# Patient Record
Sex: FEMALE | Race: AMERICAN INDIAN OR ALASKA NATIVE | NOT HISPANIC OR LATINO | Employment: UNEMPLOYED | URBAN - METROPOLITAN AREA
[De-identification: names, ages, dates, MRNs, and addresses within clinical notes are randomized per-mention and may not be internally consistent; named-entity substitution may affect disease eponyms.]

---

## 2017-01-01 ENCOUNTER — HOSPITAL ENCOUNTER (EMERGENCY)
Facility: CLINIC | Age: 27
Discharge: HOME | End: 2017-01-01
Attending: EMERGENCY MEDICINE | Admitting: EMERGENCY MEDICINE

## 2017-01-01 ENCOUNTER — APPOINTMENT (OUTPATIENT)
Dept: GENERAL RADIOLOGY | Facility: CLINIC | Age: 27
End: 2017-01-01
Attending: EMERGENCY MEDICINE

## 2017-01-01 VITALS
SYSTOLIC BLOOD PRESSURE: 139 MMHG | DIASTOLIC BLOOD PRESSURE: 85 MMHG | OXYGEN SATURATION: 98 % | HEART RATE: 97 BPM | TEMPERATURE: 97.6 F | RESPIRATION RATE: 16 BRPM

## 2017-01-01 DIAGNOSIS — S61.219A FINGER LACERATION, INITIAL ENCOUNTER: ICD-10-CM

## 2017-01-01 PROCEDURE — 73130 X-RAY EXAM OF HAND: CPT | Mod: LT

## 2017-01-01 PROCEDURE — 86803 HEPATITIS C AB TEST: CPT | Performed by: EMERGENCY MEDICINE

## 2017-01-01 PROCEDURE — 99284 EMERGENCY DEPT VISIT MOD MDM: CPT | Mod: Z6 | Performed by: EMERGENCY MEDICINE

## 2017-01-01 PROCEDURE — 25000132 ZZH RX MED GY IP 250 OP 250 PS 637: Performed by: EMERGENCY MEDICINE

## 2017-01-01 PROCEDURE — 86706 HEP B SURFACE ANTIBODY: CPT | Performed by: EMERGENCY MEDICINE

## 2017-01-01 PROCEDURE — 36415 COLL VENOUS BLD VENIPUNCTURE: CPT | Performed by: EMERGENCY MEDICINE

## 2017-01-01 PROCEDURE — 99284 EMERGENCY DEPT VISIT MOD MDM: CPT | Performed by: EMERGENCY MEDICINE

## 2017-01-01 PROCEDURE — 87389 HIV-1 AG W/HIV-1&-2 AB AG IA: CPT | Performed by: EMERGENCY MEDICINE

## 2017-01-01 RX ORDER — OXYCODONE HYDROCHLORIDE 5 MG/1
10 TABLET ORAL ONCE
Status: COMPLETED | OUTPATIENT
Start: 2017-01-01 | End: 2017-01-01

## 2017-01-01 RX ORDER — ACETAMINOPHEN 325 MG/1
650 TABLET ORAL EVERY 4 HOURS PRN
Status: DISCONTINUED | OUTPATIENT
Start: 2017-01-01 | End: 2017-01-01 | Stop reason: HOSPADM

## 2017-01-01 RX ORDER — ACETAMINOPHEN 500 MG
500 TABLET ORAL EVERY 6 HOURS PRN
Qty: 30 TABLET | Refills: 0 | Status: SHIPPED | OUTPATIENT
Start: 2017-01-01 | End: 2017-01-08

## 2017-01-01 RX ADMIN — AMOXICILLIN AND CLAVULANATE POTASSIUM 1 TABLET: 875; 125 TABLET, FILM COATED ORAL at 17:00

## 2017-01-01 RX ADMIN — ACETAMINOPHEN 650 MG: 325 TABLET, FILM COATED ORAL at 16:54

## 2017-01-01 RX ADMIN — OXYCODONE HYDROCHLORIDE 10 MG: 5 TABLET ORAL at 16:54
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
## 2017-01-01 NOTE — DISCHARGE INSTRUCTIONS
Infected Laceration, Not Sutured  A laceration is a cut through the skin. The cut has become infected. Because of the infection, and the amount of time that has passed since injury, the wound cannot be closed. It will heal best if left open and cleaned daily. It will seal over by growing new tissue from the sides and the bottom of the wound. Antibiotics may be prescribed. A scar will probably remain after it has healed.   Oral antibiotic medicine may be prescribed to treat the infection.  Home care    If antibiotics have been prescribed, take them exactly as directed. Do not stop taking them until they are gone or you are told to stop, even if you feel better.     Follow the health care provider s instructions on how to care for the cut.    Unless otherwise instructed, change the bandage twice a day for the first few days, until the drainage stops. Then change it once a day. Change the bandage if it becomes wet, stained with wound fluid, or dirty.    Clean the wound daily:    After removing the bandage, gently wash the area with soap and water. Use a wet cotton swab to loosen and remove any blood or crust that forms.    After cleaning, apply a thin layer of over-the-counter antibiotic ointment if advised. Reapply a fresh bandage.    Follow the healthcare provider's instructions for keeping the wound dry. You may be given restrictions on showering or tub baths.    If the bandage gets wet, remove it. Gently pat the wound dry with a clean cloth, then replace the wet bandage with a dry one.    Do not scratch, rub, or pick at the area.    Wash your hands with soap and warm water before and after cleaning the wound or changing the bandage.  Follow-up care  Follow up with your healthcare provider as advised. It is important to follow up to ensure that the infection is improving.  When to seek medical advice  Call your healthcare provider right away if any of these occur:    Symptoms don't begin to improve or get  worse    Red streaks coming from the wound    Increase in pus coming from the wound    Fever of 100.4 F (38 C) or higher, or as directed by your healthcare provider    4585-4676 [employer]. [address] [place] [address], [place] [address]. All rights reserved. This information is not intended as a substitute for professional medical care. Always follow your healthcare professional's instructions.

## 2017-01-01 NOTE — ED AVS SNAPSHOT
[facility], Emergency Department    [address]    [place] [address]    Phone:  [phone]    Fax:  [phone]                                       Keyona Fisher   MRN: [id number]    Department:  [facility], Emergency Department   Date of Visit:  1/1/2017           Patient Information     Date Of Birth          1990        Your diagnoses for this visit were:     Finger laceration, initial encounter        You were seen by Gilmar Antoine MD.      Follow-up Information     Follow up with Clinic, [facility]. Schedule an appointment as soon as possible for a visit in 2 days.    Contact information:    [address] [facility] [id number] [phone]          Follow up with [facility], Emergency Department In 2 days.    Specialty:  EMERGENCY MEDICINE    Why:  For wound re-check, or sooner if symptoms worsen    Contact information:    [address] [facility] [address] [phone]    Additional information:    The [facility] is located in the [facility]. lt is easily accessible from virtually any point in the [facility] area, via [address]        Discharge Instructions         Infected Laceration, Not Sutured  A laceration is a cut through the skin. The cut has become infected. Because of the infection, and the amount of time that has passed since injury, the wound cannot be closed. It will heal best if left open and cleaned daily. It will seal over by growing new tissue from the sides and the bottom of the wound. Antibiotics may be prescribed. A scar will probably remain after it has healed.   Oral antibiotic medicine may be prescribed to treat the infection.  Home care    If antibiotics have been prescribed, take them exactly as directed. Do not stop taking them until they are gone or you are told to stop, even if you feel better.     Follow the health care provider s instructions on how to care for the  cut.    Unless otherwise instructed, change the bandage twice a day for the first few days, until the drainage stops. Then change it once a day. Change the bandage if it becomes wet, stained with wound fluid, or dirty.    Clean the wound daily:    After removing the bandage, gently wash the area with soap and water. Use a wet cotton swab to loosen and remove any blood or crust that forms.    After cleaning, apply a thin layer of over-the-counter antibiotic ointment if advised. Reapply a fresh bandage.    Follow the healthcare provider's instructions for keeping the wound dry. You may be given restrictions on showering or tub baths.    If the bandage gets wet, remove it. Gently pat the wound dry with a clean cloth, then replace the wet bandage with a dry one.    Do not scratch, rub, or pick at the area.    Wash your hands with soap and warm water before and after cleaning the wound or changing the bandage.  Follow-up care  Follow up with your healthcare provider as advised. It is important to follow up to ensure that the infection is improving.  When to seek medical advice  Call your healthcare provider right away if any of these occur:    Symptoms don't begin to improve or get worse    Red streaks coming from the wound    Increase in pus coming from the wound    Fever of 100.4 F (38 C) or higher, or as directed by your healthcare provider    4545-5200 [employer]. [address] [place], [place] [address]. All rights reserved. This information is not intended as a substitute for professional medical care. Always follow your healthcare professional's instructions.          24 Hour Appointment Hotline       To make an appointment at any [facility], call [phone] ([phone]). If you don't have a family doctor or clinic, we will help you find one. [facility] are conveniently located to serve the needs of you and your family.             Review of your medicines      START taking         Dose / Directions Last dose taken    amoxicillin-clavulanate 875-125 MG per tablet   Commonly known as:  AUGMENTIN   Dose:  1 tablet   Quantity:  20 tablet        Take 1 tablet by mouth 2 times daily   Refills:  0          Our records show that you are taking the medicines listed below. If these are incorrect, please call your family doctor or clinic.        Dose / Directions Last dose taken    oxyCODONE-acetaminophen 5-325 MG per tablet   Commonly known as:  PERCOCET   Dose:  1-2 tablet   Quantity:  15 tablet        Take 1-2 tablets by mouth every 4 hours as needed for pain   Refills:  0        TYLENOL PO        Refills:  0                Prescriptions were sent or printed at these locations (1 Prescription)                   Other Prescriptions                Printed at Department/Unit printer (1 of 1)         amoxicillin-clavulanate (AUGMENTIN) 875-125 MG per tablet                Procedures and tests performed during your visit     Hand XR, G/E 3 views, left      Orders Needing Specimen Collection     Ordered          01/01/17 1639  Hepatitis B surface ira immune s - STAT, [facility]: STAT, Needs to be Collected     Scheduled Task Status   01/01/17 1638 Collect Hepatitis B surface ira immune s Open   Order Class:  PCU Collect                01/01/17 1639  Hepatitis C antibody - STAT, [facility]: STAT, Needs to be Collected     Scheduled Task Status   01/01/17 1638 Collect Hepatitis C antibody Open   Order Class:  PCU Collect                  Pending Results     Date and Time Order Name Status Description    1/1/2017 1635 Hand XR, G/E 3 views, left Preliminary             Thank you for choosing [facility]       Thank you for choosing [facility] for your care. Our goal is always to provide you with excellent care. Hearing back from our patients is one way we can continue to improve our services. Please take a few minutes to complete the written survey that you may receive in the mail after you visit with us. Thank you!       "  MyChart Information     [employer] lets you send messages to your doctor, view your test results, renew your prescriptions, schedule appointments and more. To sign up, go to [email or web address] . Click on \"Log in\" on the left side of the screen, which will take you to the Welcome page. Then click on \"Sign up Now\" on the right side of the page.     You will be asked to enter the access code listed below, as well as some personal information. Please follow the directions to create your username and password.     Your access code is: [email or web address]  Expires: 3/16/2017  9:51 PM     Your access code will  in 90 days. If you need help or a new code, please call your [facility] clinic or [phone].        After Visit Summary       This is your record. Keep this with you and show to your community pharmacist(s) and doctor(s) at your next visit.                  "

## 2017-01-01 NOTE — ED AVS SNAPSHOT
[facility], [place], Emergency Department    [address]    [facility] [address]    Phone:  [phone]    Fax:  [phone]                                       Keyona Fisher   MRN: [id number]    Department:  [facility], Emergency Department   Date of Visit:  1/1/2017           After Visit Summary Signature Page     I have received my discharge instructions, and my questions have been answered. I have discussed any challenges I see with this plan with the nurse or doctor.    ..........................................................................................................................................  Patient/Patient Representative Signature      ..........................................................................................................................................  Patient Representative Print Name and Relationship to Patient    ..................................................               ................................................  Date                                            Time    ..........................................................................................................................................  Reviewed by Signature/Title    ...................................................              ..............................................  Date                                                            Time

## 2017-01-02 LAB
HBV SURFACE AB SERPL IA-ACNC: 5.15 M[IU]/ML
HCV AB SERPL QL IA: NORMAL
HIV 1+2 AB+HIV1 P24 AG SERPL QL IA: NORMAL

## 2017-01-03 ENCOUNTER — TELEPHONE (OUTPATIENT)
Dept: EMERGENCY MEDICINE | Facility: CLINIC | Age: 27
End: 2017-01-03
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
## 2017-01-05 NOTE — TELEPHONE ENCOUNTER
[facility] Emergency Department Lab result notification     Patient/parent Name  Keyona    RN Assessment (Patient s current Symptoms), include time called.  [Insert Left message here if message left]  12:51 pm Pt returned call. I gave her lab results as below. S    RN Recommendations/Instructions per [facility] ED lab result protocol  Advised to continue antibiotics as prescribed for finger injury and to F/U with her PCP as recommended.     Please Contact your PCP clinic or return to the Emergency department if your:    Symptoms return.    Symptoms do not improve after 3 days on antibiotic.    Symptoms do not resolve after completing antibiotic.    Symptoms worsen or other concerning symptom's.    Larisa Fletcher RN  [facility] RN  Lung Nodule and ED Lab Result F/u RN  Epic pool (ED late result f/u RN): [id number]  # [phone]

## 2017-04-19 ENCOUNTER — APPOINTMENT (OUTPATIENT)
Dept: CT IMAGING | Facility: CLINIC | Age: 27
End: 2017-04-19
Attending: FAMILY MEDICINE

## 2017-04-19 ENCOUNTER — HOSPITAL ENCOUNTER (EMERGENCY)
Facility: CLINIC | Age: 27
Discharge: HOME | End: 2017-04-19
Attending: FAMILY MEDICINE | Admitting: FAMILY MEDICINE

## 2017-04-19 VITALS
DIASTOLIC BLOOD PRESSURE: 96 MMHG | SYSTOLIC BLOOD PRESSURE: 123 MMHG | TEMPERATURE: 98.3 F | WEIGHT: 273.4 LBS | BODY MASS INDEX: 48.43 KG/M2 | OXYGEN SATURATION: 100 % | RESPIRATION RATE: 16 BRPM | HEART RATE: 87 BPM

## 2017-04-19 DIAGNOSIS — J02.0 ACUTE STREPTOCOCCAL PHARYNGITIS: ICD-10-CM

## 2017-04-19 LAB
ANION GAP SERPL CALCULATED.3IONS-SCNC: 6 MMOL/L (ref 3–14)
BASOPHILS # BLD AUTO: 0 10E9/L (ref 0–0.2)
BASOPHILS NFR BLD AUTO: 0.1 %
BUN SERPL-MCNC: 11 MG/DL (ref 7–30)
CALCIUM SERPL-MCNC: 8.8 MG/DL (ref 8.5–10.1)
CHLORIDE SERPL-SCNC: 109 MMOL/L (ref 94–109)
CO2 SERPL-SCNC: 28 MMOL/L (ref 20–32)
CREAT BLD-MCNC: 0.7 MG/DL (ref 0.52–1.04)
CREAT SERPL-MCNC: 0.64 MG/DL (ref 0.52–1.04)
DEPRECATED S PYO AG THROAT QL EIA: ABNORMAL
DIFFERENTIAL METHOD BLD: NORMAL
EOSINOPHIL # BLD AUTO: 0.1 10E9/L (ref 0–0.7)
EOSINOPHIL NFR BLD AUTO: 0.9 %
ERYTHROCYTE [DISTWIDTH] IN BLOOD BY AUTOMATED COUNT: 14.3 % (ref 10–15)
GFR SERPL CREATININE-BSD FRML MDRD: >90 ML/MIN/1.7M2
GFR SERPL CREATININE-BSD FRML MDRD: NORMAL ML/MIN/1.7M2
GLUCOSE SERPL-MCNC: 86 MG/DL (ref 70–99)
HCT VFR BLD AUTO: 40.7 % (ref 35–47)
HGB BLD-MCNC: 13.8 G/DL (ref 11.7–15.7)
IMM GRANULOCYTES # BLD: 0 10E9/L (ref 0–0.4)
IMM GRANULOCYTES NFR BLD: 0.3 %
LYMPHOCYTES # BLD AUTO: 1.9 10E9/L (ref 0.8–5.3)
LYMPHOCYTES NFR BLD AUTO: 17.6 %
MCH RBC QN AUTO: 28.5 PG (ref 26.5–33)
MCHC RBC AUTO-ENTMCNC: 33.9 G/DL (ref 31.5–36.5)
MCV RBC AUTO: 84 FL (ref 78–100)
MICRO REPORT STATUS: ABNORMAL
MONOCYTES # BLD AUTO: 0.9 10E9/L (ref 0–1.3)
MONOCYTES NFR BLD AUTO: 8.4 %
NEUTROPHILS # BLD AUTO: 7.7 10E9/L (ref 1.6–8.3)
NEUTROPHILS NFR BLD AUTO: 72.7 %
NRBC # BLD AUTO: 0 10*3/UL
NRBC BLD AUTO-RTO: 0 /100
PLATELET # BLD AUTO: 221 10E9/L (ref 150–450)
POTASSIUM SERPL-SCNC: 3.9 MMOL/L (ref 3.4–5.3)
RBC # BLD AUTO: 4.85 10E12/L (ref 3.8–5.2)
SODIUM SERPL-SCNC: 143 MMOL/L (ref 133–144)
SPECIMEN SOURCE: ABNORMAL
WBC # BLD AUTO: 10.6 10E9/L (ref 4–11)

## 2017-04-19 PROCEDURE — 70491 CT SOFT TISSUE NECK W/DYE: CPT

## 2017-04-19 PROCEDURE — 99284 EMERGENCY DEPT VISIT MOD MDM: CPT | Mod: Z6 | Performed by: FAMILY MEDICINE

## 2017-04-19 PROCEDURE — 25500064 ZZH RX 255 OP 636: Performed by: FAMILY MEDICINE

## 2017-04-19 PROCEDURE — 99285 EMERGENCY DEPT VISIT HI MDM: CPT | Mod: 25 | Performed by: FAMILY MEDICINE

## 2017-04-19 PROCEDURE — 96360 HYDRATION IV INFUSION INIT: CPT | Performed by: FAMILY MEDICINE

## 2017-04-19 PROCEDURE — 82565 ASSAY OF CREATININE: CPT

## 2017-04-19 PROCEDURE — 25000128 H RX IP 250 OP 636: Performed by: FAMILY MEDICINE

## 2017-04-19 PROCEDURE — 80048 BASIC METABOLIC PNL TOTAL CA: CPT | Performed by: FAMILY MEDICINE

## 2017-04-19 PROCEDURE — 25000125 ZZHC RX 250: Performed by: FAMILY MEDICINE

## 2017-04-19 PROCEDURE — 25000132 ZZH RX MED GY IP 250 OP 250 PS 637: Performed by: FAMILY MEDICINE

## 2017-04-19 PROCEDURE — 85025 COMPLETE CBC W/AUTO DIFF WBC: CPT | Performed by: FAMILY MEDICINE

## 2017-04-19 PROCEDURE — 87880 STREP A ASSAY W/OPTIC: CPT | Performed by: EMERGENCY MEDICINE

## 2017-04-19 RX ORDER — TRAMADOL HYDROCHLORIDE 50 MG/1
50-100 TABLET ORAL EVERY 6 HOURS PRN
Qty: 15 TABLET | Refills: 0 | Status: ON HOLD | OUTPATIENT
Start: 2017-04-19 | End: 2018-01-28

## 2017-04-19 RX ORDER — IOPAMIDOL 755 MG/ML
100 INJECTION, SOLUTION INTRAVASCULAR ONCE
Status: COMPLETED | OUTPATIENT
Start: 2017-04-19 | End: 2017-04-19

## 2017-04-19 RX ORDER — HYDROCODONE BITARTRATE AND ACETAMINOPHEN 5; 325 MG/1; MG/1
1 TABLET ORAL ONCE
Status: COMPLETED | OUTPATIENT
Start: 2017-04-19 | End: 2017-04-19

## 2017-04-19 RX ADMIN — IOPAMIDOL 80 ML: 755 INJECTION, SOLUTION INTRAVENOUS at 17:49

## 2017-04-19 RX ADMIN — SODIUM CHLORIDE 1000 ML: 9 INJECTION, SOLUTION INTRAVENOUS at 16:53

## 2017-04-19 RX ADMIN — SODIUM CHLORIDE 50 ML: 9 INJECTION, SOLUTION INTRAVENOUS at 17:50

## 2017-04-19 RX ADMIN — HYDROCODONE BITARTRATE AND ACETAMINOPHEN 1 TABLET: 5; 325 TABLET ORAL at 16:34

## 2017-04-19 NOTE — DISCHARGE INSTRUCTIONS
Pharyngitis: Strep (Confirmed)     You have had a positive test for strep throat. Strep throat is a contagious illness. It is spread by coughing, kissing or by touching others after touching your mouth or nose. Symptoms include throat pain which is worse with swallowing, aching all over, headache and fever. It is treated with antibiotic medication. This should help you start to feel better within 1-2 days.  Home care    Rest at home. Drink plenty of fluids to avoid dehydration.    No work or school for the first 2 days of taking the antibiotics. After this time, you will not be contagious. You can then return to school or work if you are feeling better.     The antibiotic medication must be taken for the full 10 days, even if you feel better. This is very important to ensure the infection is treated. It is also important to prevent drug-resistent organisms from developing. If you were given an antibiotic shot, no more antibiotics are needed.    You may use acetaminophen (Tylenol) or ibuprofen (Motrin, Advil) to control pain or fever, unless another medicine was prescribed for this. (NOTE: If you have chronic liver or kidney disease or ever had a stomach ulcer or GI bleeding, talk with your doctor before using these medicines.)    Throat lozenges or sprays (such as Chloraseptic) help reduce pain. Gargling with warm salt water will also reduce throat pain. Dissolve 1/2 teaspoon of salt in 1 glass of warm water. This may be useful just before meals.     Soft foods are okay. Avoid salty or spicy foods.  Follow-up care  Follow up with your healthcare provider or our staff if you are not improving over the next week.  When to seek medical advice  Call your healthcare provider right away if any of these occur:    Fever as directed by your doctor     New or worsening ear pain, sinus pain, or headache    Painful lumps in the back of neck    Stiff neck    Lymph nodes are getting larger or becoming soft in the  middle    Inability to swallow liquids, excessive drooling, or inability to open mouth wide due to throat pain    Signs of dehydration (very dark urine or no urine, sunken eyes, dizziness)    Trouble breathing or noisy breathing    Muffled voice    New rash    6848-9767 [employer]. [address] [place] [address]. All rights reserved. This information is not intended as a substitute for professional medical care. Always follow your healthcare professional's instructions.

## 2017-04-19 NOTE — ED AVS SNAPSHOT
[facility], Emergency Department    [address]    [place] [address]    Phone:  [phone]    Fax:  [phone]                                       Keyona Fisher   MRN: [id number]    Department:  [facility], Emergency Department   Date of Visit:  4/19/2017           Patient Information     Date Of Birth          1990        Your diagnoses for this visit were:     Acute streptococcal pharyngitis        You were seen by John Rebollar MD.      Follow-up Information     Follow up with Clinic, [facility].    Why:  As needed    Contact information:    [address] [facility] [address]  [phone]          Discharge Instructions         Pharyngitis: Strep (Confirmed)     You have had a positive test for strep throat. Strep throat is a contagious illness. It is spread by coughing, kissing or by touching others after touching your mouth or nose. Symptoms include throat pain which is worse with swallowing, aching all over, headache and fever. It is treated with antibiotic medication. This should help you start to feel better within 1-2 days.  Home care    Rest at home. Drink plenty of fluids to avoid dehydration.    No work or school for the first 2 days of taking the antibiotics. After this time, you will not be contagious. You can then return to school or work if you are feeling better.     The antibiotic medication must be taken for the full 10 days, even if you feel better. This is very important to ensure the infection is treated. It is also important to prevent drug-resistent organisms from developing. If you were given an antibiotic shot, no more antibiotics are needed.    You may use acetaminophen (Tylenol) or ibuprofen (Motrin, Advil) to control pain or fever, unless another medicine was prescribed for this. (NOTE: If you have chronic liver or kidney disease or ever had a stomach ulcer or GI bleeding, talk with your doctor before using these  medicines.)    Throat lozenges or sprays (such as Chloraseptic) help reduce pain. Gargling with warm salt water will also reduce throat pain. Dissolve 1/2 teaspoon of salt in 1 glass of warm water. This may be useful just before meals.     Soft foods are okay. Avoid salty or spicy foods.  Follow-up care  Follow up with your healthcare provider or our staff if you are not improving over the next week.  When to seek medical advice  Call your healthcare provider right away if any of these occur:    Fever as directed by your doctor     New or worsening ear pain, sinus pain, or headache    Painful lumps in the back of neck    Stiff neck    Lymph nodes are getting larger or becoming soft in the middle    Inability to swallow liquids, excessive drooling, or inability to open mouth wide due to throat pain    Signs of dehydration (very dark urine or no urine, sunken eyes, dizziness)    Trouble breathing or noisy breathing    Muffled voice    New rash    2237-3608 [employer]. [address] [place] [address]. All rights reserved. This information is not intended as a substitute for professional medical care. Always follow your healthcare professional's instructions.          24 Hour Appointment Hotline       To make an appointment at any [facility] clinic, call [phone] ([phone]). If you don't have a family doctor or clinic, we will help you find one. [facility] clinics are conveniently located to serve the needs of you and your family.             Review of your medicines      START taking        Dose / Directions Last dose taken    amoxicillin-clavulanate 875-125 MG per tablet   Commonly known as:  AUGMENTIN   Dose:  1 tablet   Quantity:  20 tablet        Take 1 tablet by mouth 2 times daily for 10 days   Refills:  0        traMADol 50 MG tablet   Commonly known as:  ULTRAM   Dose:   mg   Quantity:  15 tablet        Take 1-2 tablets ( mg) by mouth every 6 hours as needed for pain  "  Refills:  0          Our records show that you are taking the medicines listed below. If these are incorrect, please call your family doctor or clinic.        Dose / Directions Last dose taken    acetaminophen 500 MG Caps   Dose:  2 tablet        Take 2 tablets by mouth every 6 hours as needed   Refills:  0                Prescriptions were sent or printed at these locations (2 Prescriptions)                   Other Prescriptions                Printed at Department/Unit printer (2 of 2)         amoxicillin-clavulanate (AUGMENTIN) 875-125 MG per tablet               traMADol (ULTRAM) 50 MG tablet                Procedures and tests performed during your visit     Basic metabolic panel    CBC with platelets differential    Creatinine POCT    ISTAT creatinine  POCT    Rapid strep screen    Soft tissue neck CT w contrast      Orders Needing Specimen Collection     None      Pending Results     Date and Time Order Name Status Description    4/19/2017 1613 Soft tissue neck CT w contrast Preliminary             Pending Culture Results     No orders found from 4/17/2017 to 4/20/2017.            Thank you for choosing [facility]       Thank you for choosing [facility] for your care. Our goal is always to provide you with excellent care. Hearing back from our patients is one way we can continue to improve our services. Please take a few minutes to complete the written survey that you may receive in the mail after you visit with us. Thank you!        [employer] Information     [employer] lets you send messages to your doctor, view your test results, renew your prescriptions, schedule appointments and more. To sign up, go to [email or web address] . Click on \"Log in\" on the left side of the screen, which will take you to the Welcome page. Then click on \"Sign up Now\" on the right side of the page.     You will be asked to enter the access code listed below, as well as some personal information. Please follow the directions to create " your username and password.     Your access code is: [email or web address]  Expires: 2017  6:14 PM     Your access code will  in 90 days. If you need help or a new code, please call your [facility] or [phone].        Care EveryWhere ID     This is your Care EveryWhere ID. This could be used by other organizations to access your [facility] medical records  [id number]        After Visit Summary       This is your record. Keep this with you and show to your community pharmacist(s) and doctor(s) at your next visit.

## 2017-04-19 NOTE — ED AVS SNAPSHOT
[facility], [place], Emergency Department    [address]    [place] [address]    Phone:  [phone]    Fax:  [phone]                                       Keyona Fisher   MRN: [id number]    Department:  [facility], Emergency Department   Date of Visit:  4/19/2017           After Visit Summary Signature Page     I have received my discharge instructions, and my questions have been answered. I have discussed any challenges I see with this plan with the nurse or doctor.    ..........................................................................................................................................  Patient/Patient Representative Signature      ..........................................................................................................................................  Patient Representative Print Name and Relationship to Patient    ..................................................               ................................................  Date                                            Time    ..........................................................................................................................................  Reviewed by Signature/Title    ...................................................              ..............................................  Date                                                            Time

## 2017-04-21 ASSESSMENT — ENCOUNTER SYMPTOMS
SHORTNESS OF BREATH: 0
TROUBLE SWALLOWING: 1
FEVER: 0
ABDOMINAL PAIN: 0
SORE THROAT: 1

## 2017-04-21 NOTE — ED PROVIDER NOTES
History     Chief Complaint   Patient presents with     Pharyngitis     sore throat started yesterday     HPI  Keyona Fisher is a 26 year old female who presents to emergency room today with a 3 to four-day history of increasing sore throat now having some difficulty swallowing food and feeling as though her throat has swollen she has increased pain fevers chills and slight cough she also notes bilateral ear discomfort.  Patient denies any abdominal pain no change in bowel or bladder symptoms and at this time is primarily focused on the sore throat.    I have reviewed the Medications, Allergies, Past Medical and Surgical History, and Social History in the Epic system.    PERSONAL MEDICAL HISTORY  History reviewed. No pertinent past medical history.  PAST SURGICAL HISTORY  History reviewed. No pertinent surgical history.  FAMILY HISTORY  No family history on file.  SOCIAL HISTORY  Social History   Substance Use Topics     Smoking status: Current Every Day Smoker     Packs/day: 0.25     Smokeless tobacco: Never Used     Alcohol use No      Comment: occ     MEDICATIONS  No current facility-administered medications for this encounter.      Current Outpatient Prescriptions   Medication     acetaminophen 500 MG CAPS     amoxicillin-clavulanate (AUGMENTIN) 875-125 MG per tablet     traMADol (ULTRAM) 50 MG tablet     ALLERGIES  Allergies   Allergen Reactions     Ibuprofen          Review of Systems   Constitutional: Negative for fever.   HENT: Positive for congestion, ear pain, sore throat and trouble swallowing.    Respiratory: Negative for shortness of breath.    Cardiovascular: Negative for chest pain.   Gastrointestinal: Negative for abdominal pain.   All other systems reviewed and are negative.      Physical Exam   BP: 115/70  Pulse: 89  Temp: 99.4  F (37.4  C)  Resp: 16  Weight: 124 kg (273 lb 6.4 oz)  SpO2: 97 %  Physical Exam   Constitutional: She is oriented to person, place, and time. No distress.   HENT:    Head: Atraumatic.   Right Ear: A middle ear effusion is present.   Left Ear: A middle ear effusion is present.   Mouth/Throat: Oropharyngeal exudate and posterior oropharyngeal erythema present.   Patient had basic swelling of the posterior tonsils with significant increase in swelling on the left side I'm suspicious there may be underlying abscess and therefore obtained a CT.   Eyes: Pupils are equal, round, and reactive to light. No scleral icterus.   Cardiovascular: Normal heart sounds and intact distal pulses.    Pulmonary/Chest: Breath sounds normal. No respiratory distress.   Abdominal: Soft. Bowel sounds are normal. There is no tenderness.   Musculoskeletal: She exhibits no edema or tenderness.   Neurological: She is alert and oriented to person, place, and time. No cranial nerve deficit. She exhibits normal muscle tone. Coordination normal.   Skin: Skin is warm. No rash noted. She is not diaphoretic.       ED Course     ED Course     Procedures         Critical Care time:  none       CT SCAN OF THE NECK WITH CONTRAST 4/19/2017 5:48 PM      HISTORY: Left-sided peritonsillar pain.     TECHNIQUE: Axial images and coronal reformations. 80 mL Isovue-370 IV.  Radiation dose for this scan was reduced using automated exposure  control, adjustment of the mA and/or kV according to patient size, or  iterative reconstruction technique.     COMPARISON: None.     FINDINGS:  Visualized sinuses, nasopharynx and orbits: Minimal mucosal thickening  noted in the right maxillary sinus. No air-fluid levels. The orbits  and nasopharynx are normal.     Tongue, oral cavity and oropharynx: Normal. No evidence for any  palatine tonsillar abscess.     Hypopharynx: Normal.      Larynx and trachea: Normal.      Thyroid: Normal.     Submandibular glands: Normal.      Parotid glands: Normal.       Lymph nodes: There are mildly prominent jugulodigastric lymph nodes  bilaterally, but these are not definitely pathologic.      Vasculature:  Normal.      Upper mediastinum and lungs: Normal.      Bones: Normal.         IMPRESSION:  1. No evidence for abscess or any definite acute process.  2. Minimal mucosal thickening in the right maxillary sinus.  3. Mildly prominent jugulodigastric lymph nodes bilaterally, but these  are not definitely pathologic.     CLAUDY HIDALGO MD         Labs Ordered and Resulted from Time of ED Arrival Up to the Time of Departure from the ED   RAPID STREP SCREEN - Abnormal; Notable for the following:        Result Value    Rapid Strep A Screen   (*)     Value: POSITIVE: Group A Streptococcal antigen detected by immunoassay.    All other components within normal limits   CBC WITH PLATELETS DIFFERENTIAL   BASIC METABOLIC PANEL   CREATININE POCT   ISTAT CREATININE NURSING POCT       Assessments & Plan (with Medical Decision Making)       I have reviewed the nursing notes.    I have reviewed the findings, diagnosis, plan and need for follow up with the patient.  Patient testing positive for strep pharyngitis I was concerned about the possibility of underlying peritonsillar abscess since there was some asymmetric swelling of the tonsillar tissues however after obtaining soft tissue CT did not reveal any obvious abnormality patient also has some fluid in both areas at this time I talked with her about the importance of close follow-up and she will be started on Augmentin and will follow up for recheck with her primary M.D. or return if she has any difficulties with swallowing or opening her mouth.    Discharge Medication List as of 4/19/2017  6:14 PM      START taking these medications    Details   amoxicillin-clavulanate (AUGMENTIN) 875-125 MG per tablet Take 1 tablet by mouth 2 times daily for 10 days, Disp-20 tablet, R-0, Local Print      traMADol (ULTRAM) 50 MG tablet Take 1-2 tablets ( mg) by mouth every 6 hours as needed for pain, Disp-15 tablet, R-0, Local Print             Final diagnoses:   Acute streptococcal  pharyngitis       4/19/2017   [facility], [place], EMERGENCY DEPARTMENT     John Rebollar MD  04/21/17 0962

## 2017-12-28 ENCOUNTER — HOSPITAL ENCOUNTER (OUTPATIENT)
Dept: ULTRASOUND IMAGING | Facility: CLINIC | Age: 27
Discharge: HOME | End: 2017-12-28
Attending: MIDWIFE | Admitting: MIDWIFE
Payer: COMMERCIAL

## 2017-12-28 DIAGNOSIS — O09.91 SUPERVISION OF HIGH RISK PREGNANCY IN FIRST TRIMESTER: ICD-10-CM

## 2017-12-28 DIAGNOSIS — F11.10 DRUG ABUSE, OPIOID TYPE (H): ICD-10-CM

## 2017-12-28 DIAGNOSIS — T74.11XS: ICD-10-CM

## 2017-12-28 PROCEDURE — 76805 OB US >/= 14 WKS SNGL FETUS: CPT

## 2018-01-02 ENCOUNTER — PRE VISIT (OUTPATIENT)
Dept: MATERNAL FETAL MEDICINE | Facility: CLINIC | Age: 28
End: 2018-01-02

## 2018-01-04 ENCOUNTER — OFFICE VISIT (OUTPATIENT)
Dept: MATERNAL FETAL MEDICINE | Facility: CLINIC | Age: 28
End: 2018-01-04
Attending: MIDWIFE
Payer: COMMERCIAL

## 2018-01-04 ENCOUNTER — HOSPITAL ENCOUNTER (OUTPATIENT)
Dept: ULTRASOUND IMAGING | Facility: CLINIC | Age: 28
Discharge: HOME | End: 2018-01-04
Attending: MIDWIFE | Admitting: SOCIAL WORKER
Payer: COMMERCIAL

## 2018-01-04 DIAGNOSIS — F19.90 SUBSTANCE USE DISORDER: ICD-10-CM

## 2018-01-04 DIAGNOSIS — O26.90 PREGNANCY RELATED CONDITION, UNSPECIFIED TRIMESTER: ICD-10-CM

## 2018-01-04 DIAGNOSIS — O28.8 AFI (AMNIOTIC FLUID INDEX) BORDERLINE LOW: Primary | ICD-10-CM

## 2018-01-04 DIAGNOSIS — E66.01 MORBID OBESITY (H): ICD-10-CM

## 2018-01-04 PROCEDURE — 76819 FETAL BIOPHYS PROFIL W/O NST: CPT | Performed by: MIDWIFE

## 2018-01-04 PROCEDURE — 76811 OB US DETAILED SNGL FETUS: CPT

## 2018-01-04 NOTE — PROGRESS NOTES
Please see ultrasound report under imaging tab for details on ultrasound performed today.    Vanessa Olson MD  , OB/GYN  Maternal-Fetal Medicine  [email or web address]  [phone] (Academic office)  [phone] (Pager)

## 2018-01-04 NOTE — MR AVS SNAPSHOT
After Visit Summary   1/4/2018    Keyona Fisher    MRN: [id number]           Patient Information     Date Of Birth          1990        Visit Information        Provider Department      1/4/2018 8:30 AM Vanessa Olson MD [facility] Maternal Fetal Medicine - [place]        Today's Diagnoses     FRANCESCA (amniotic fluid index) borderline low    -  1    Morbid obesity (H)        Substance use disorder           Follow-ups after your visit        Your next 10 appointments already scheduled     Jan 11, 2018  3:30 PM CST   (Arrive by 3:15 PM)   ANDRE BPP SINGLE with [email or web address]   [facility] Ultrasound - [facility])    [address]  [place] [address]   [phone]            Jan 11, 2018  4:00 PM CST   Radiology MD with ANH POWELL MD   [facility] Maternal Fetal Medicine - [facility])    [address]  [facility] [address]   [phone]           Please arrive at the time given for your first appointment. This visit is used internally to schedule the physician's time during your ultrasound.            Jan 18, 2018  3:30 PM CST   (Arrive by 3:15 PM)   ANDRE BPP SINGLE with [email or web address]   [facility] - [place] ([facility])    [address]  [place] [address]   [phone]            Jan 18, 2018  4:00 PM CST   Radiology MD with ANH POWELL MD   [facility] Maternal Fetal Medicine - [place] ([facility])    [address]  [facility] [address]   [phone]           Please arrive at the time given for your first appointment. This visit is used internally to schedule the physician's time during your ultrasound.            Jan 25, 2018 11:45 AM CST   (Arrive by 11:30 AM)   ANDRE US COMPRE SINGLE F/U with [email or web address]   [facility] -  "[place] ([facility])    [address]  [facility] [address]   [phone]           Wear comfortable clothes and leave your valuables at home.            Jan 25, 2018 12:15 PM CST   Radiology MD with UR ANDRE PADILLA   [facility] Maternal Fetal Medicine - [place] ([facility])    [address]  [facility] [address]   [phone]           Please arrive at the time given for your first appointment. This visit is used internally to schedule the physician's time during your ultrasound.              Future tests that were ordered for you today     Open Future Orders        Priority Expected Expires Ordered    [facility] US Comprehensive Single F/U Routine 1/25/2018 1/4/2019 1/4/2018    [facility] BPP Single Routine 1/11/2018 11/4/2018 1/4/2018    [facility] BPP Single Routine 1/18/2018 11/4/2018 1/4/2018            Who to contact     If you have questions or need follow up information about today's clinic visit or your schedule please contact [facility] directly at [phone].  Normal or non-critical lab and imaging results will be communicated to you by MyChart, letter or phone within 4 business days after the clinic has received the results. If you do not hear from us within 7 days, please contact the clinic through [employer] or phone. If you have a critical or abnormal lab result, we will notify you by phone as soon as possible.  Submit refill requests through [employer] or call your pharmacy and they will forward the refill request to us. Please allow 3 business days for your refill to be completed.          Additional Information About Your Visit        [employer] Information     [employer] lets you send messages to your doctor, view your test results, renew your prescriptions, schedule appointments and more. To sign up, go to [email or web address] . Click on \"Log in\" on the left side of the screen, which will take you to " "the Welcome page. Then click on \"Sign up Now\" on the right side of the page.     You will be asked to enter the access code listed below, as well as some personal information. Please follow the directions to create your username and password.     Your access code is: [email or web address]  Expires: 2018 11:06 AM     Your access code will  in 90 days. If you need help or a new code, please call your [facility] clinic or [phone].        Care EveryWhere ID     This is your Care EveryWhere ID. This could be used by other organizations to access your [facility] medical records  [id number]        Your Vitals Were     Last Period                   2017 (Approximate)            Blood Pressure from Last 3 Encounters:   17 (!) 123/96   17 139/85   16 116/58    Weight from Last 3 Encounters:   17 124 kg (273 lb 6.4 oz)   16 108.9 kg (240 lb)               Primary Care Provider Office Phone #    [facility] [phone]       [facility] [address] [place] [address]        Equal Access to Services     DEL[facility] Hospital[facility]AYLEY AH: Hadii aad ku hadasho Soraisaali, waaxda luqadaha, qaybta kaalmada adeegyada, dilshad jimenez . So [facility] [phone].    ATENCIÓN: Si habla español, tiene a slater disposición servicios gratuitos de asistencia lingüística. Llame al [phone].    We comply with applicable federal civil rights laws and [place] laws. We do not discriminate on the basis of race, color, national origin, age, disability, sex, sexual orientation, or gender identity.            Thank you!     Thank you for choosing [facility]  for your care. Our goal is always to provide you with excellent care. Hearing back from our patients is one way we can continue to improve our services. Please take a few minutes to complete the written survey that you may receive in the mail after your visit with us. Thank you!             Your " Updated Medication List - Protect others around you: Learn how to safely use, store and throw away your medicines at www.disposemymeds.org.          This list is accurate as of: 1/4/18 11:06 AM.  Always use your most recent med list.                   Brand Name Dispense Instructions for use Diagnosis    acetaminophen 500 MG Caps      Take 2 tablets by mouth every 6 hours as needed        traMADol 50 MG tablet    ULTRAM    15 tablet    Take 1-2 tablets ( mg) by mouth every 6 hours as needed for pain

## 2018-01-07 ENCOUNTER — HOSPITAL ENCOUNTER (OUTPATIENT)
Facility: CLINIC | Age: 28
Discharge: HOME | End: 2018-01-07
Attending: ADVANCED PRACTICE MIDWIFE | Admitting: ADVANCED PRACTICE MIDWIFE
Payer: COMMERCIAL

## 2018-01-07 VITALS — DIASTOLIC BLOOD PRESSURE: 51 MMHG | RESPIRATION RATE: 20 BRPM | TEMPERATURE: 98.3 F | SYSTOLIC BLOOD PRESSURE: 107 MMHG

## 2018-01-07 PROBLEM — O60.00 PRETERM LABOR: Status: ACTIVE | Noted: 2018-01-07

## 2018-01-07 PROBLEM — O09.90 HIGH-RISK PREGNANCY, UNSPECIFIED TRIMESTER: Status: ACTIVE | Noted: 2018-01-07

## 2018-01-07 LAB
ALBUMIN UR-MCNC: 10 MG/DL
AMPHETAMINES UR QL SCN: NEGATIVE
APPEARANCE UR: CLEAR
BILIRUB UR QL STRIP: NEGATIVE
CANNABINOIDS UR QL: ABNORMAL
COCAINE UR QL: NEGATIVE
COLOR UR AUTO: ABNORMAL
GLUCOSE UR STRIP-MCNC: NEGATIVE MG/DL
HGB UR QL STRIP: NEGATIVE
KETONES UR STRIP-MCNC: NEGATIVE MG/DL
LEUKOCYTE ESTERASE UR QL STRIP: ABNORMAL
MUCOUS THREADS #/AREA URNS LPF: PRESENT /LPF
NITRATE UR QL: NEGATIVE
OPIATES UR QL SCN: NEGATIVE
PCP UR QL SCN: NEGATIVE
PH UR STRIP: 6.5 PH (ref 5–7)
RBC #/AREA URNS AUTO: <1 /HPF (ref 0–2)
SOURCE: ABNORMAL
SP GR UR STRIP: 1.02 (ref 1–1.03)
SQUAMOUS #/AREA URNS AUTO: 7 /HPF (ref 0–1)
UROBILINOGEN UR STRIP-MCNC: 2 MG/DL (ref 0–2)
WBC #/AREA URNS AUTO: 1 /HPF (ref 0–2)

## 2018-01-07 PROCEDURE — 87086 URINE CULTURE/COLONY COUNT: CPT | Performed by: ADVANCED PRACTICE MIDWIFE

## 2018-01-07 PROCEDURE — 25000128 H RX IP 250 OP 636: Performed by: ADVANCED PRACTICE MIDWIFE

## 2018-01-07 PROCEDURE — 96360 HYDRATION IV INFUSION INIT: CPT

## 2018-01-07 PROCEDURE — 81001 URINALYSIS AUTO W/SCOPE: CPT | Mod: XU | Performed by: ADVANCED PRACTICE MIDWIFE

## 2018-01-07 PROCEDURE — G0463 HOSPITAL OUTPT CLINIC VISIT: HCPCS | Mod: 25

## 2018-01-07 PROCEDURE — 25000132 ZZH RX MED GY IP 250 OP 250 PS 637: Performed by: ADVANCED PRACTICE MIDWIFE

## 2018-01-07 PROCEDURE — 80307 DRUG TEST PRSMV CHEM ANLYZR: CPT | Performed by: ADVANCED PRACTICE MIDWIFE

## 2018-01-07 PROCEDURE — 87653 STREP B DNA AMP PROBE: CPT | Performed by: ADVANCED PRACTICE MIDWIFE

## 2018-01-07 PROCEDURE — 59025 FETAL NON-STRESS TEST: CPT

## 2018-01-07 PROCEDURE — 96361 HYDRATE IV INFUSION ADD-ON: CPT

## 2018-01-07 PROCEDURE — 80349 CANNABINOIDS NATURAL: CPT | Performed by: ADVANCED PRACTICE MIDWIFE

## 2018-01-07 RX ORDER — DEXTROSE, SODIUM CHLORIDE, SODIUM LACTATE, POTASSIUM CHLORIDE, AND CALCIUM CHLORIDE 5; .6; .31; .03; .02 G/100ML; G/100ML; G/100ML; G/100ML; G/100ML
INJECTION, SOLUTION INTRAVENOUS
Status: DISCONTINUED
Start: 2018-01-07 | End: 2018-01-07 | Stop reason: HOSPADM

## 2018-01-07 RX ORDER — DEXTROSE, SODIUM CHLORIDE, SODIUM LACTATE, POTASSIUM CHLORIDE, AND CALCIUM CHLORIDE 5; .6; .31; .03; .02 G/100ML; G/100ML; G/100ML; G/100ML; G/100ML
500 INJECTION, SOLUTION INTRAVENOUS ONCE
Status: COMPLETED | OUTPATIENT
Start: 2018-01-07 | End: 2018-01-07

## 2018-01-07 RX ORDER — ONDANSETRON 2 MG/ML
4 INJECTION INTRAMUSCULAR; INTRAVENOUS EVERY 6 HOURS PRN
Status: DISCONTINUED | OUTPATIENT
Start: 2018-01-07 | End: 2018-01-07 | Stop reason: HOSPADM

## 2018-01-07 RX ORDER — PRENATAL VIT/IRON FUM/FOLIC AC 27MG-0.8MG
1 TABLET ORAL DAILY
Status: ON HOLD | COMMUNITY
End: 2018-01-28

## 2018-01-07 RX ORDER — ACETAMINOPHEN 325 MG/1
650-975 TABLET ORAL EVERY 4 HOURS PRN
Status: DISCONTINUED | OUTPATIENT
Start: 2018-01-07 | End: 2018-01-07 | Stop reason: HOSPADM

## 2018-01-07 RX ORDER — HYDROXYZINE HYDROCHLORIDE 50 MG/1
50-100 TABLET, FILM COATED ORAL EVERY 6 HOURS PRN
Status: DISCONTINUED | OUTPATIENT
Start: 2018-01-07 | End: 2018-01-07 | Stop reason: HOSPADM

## 2018-01-07 RX ORDER — ALUMINA, MAGNESIA, AND SIMETHICONE 2400; 2400; 240 MG/30ML; MG/30ML; MG/30ML
30 SUSPENSION ORAL
Status: DISCONTINUED | OUTPATIENT
Start: 2018-01-07 | End: 2018-01-07 | Stop reason: HOSPADM

## 2018-01-07 RX ORDER — SODIUM CHLORIDE, SODIUM LACTATE, POTASSIUM CHLORIDE, CALCIUM CHLORIDE 600; 310; 30; 20 MG/100ML; MG/100ML; MG/100ML; MG/100ML
INJECTION, SOLUTION INTRAVENOUS CONTINUOUS
Status: DISCONTINUED | OUTPATIENT
Start: 2018-01-07 | End: 2018-01-07 | Stop reason: HOSPADM

## 2018-01-07 RX ADMIN — ACETAMINOPHEN 975 MG: 325 TABLET, FILM COATED ORAL at 09:30

## 2018-01-07 RX ADMIN — SODIUM CHLORIDE, SODIUM LACTATE, POTASSIUM CHLORIDE, CALCIUM CHLORIDE AND DEXTROSE MONOHYDRATE 500 ML: 5; 600; 310; 30; 20 INJECTION, SOLUTION INTRAVENOUS at 09:30

## 2018-01-07 NOTE — IP AVS SNAPSHOT
MRN:[id number]                      After Visit Summary   1/7/2018    Keyona Fisher    MRN: [id number]           Thank you!     Thank you for choosing [facility] for your care. Our goal is always to provide you with excellent care. Hearing back from our patients is one way we can continue to improve our services. Please take a few minutes to complete the written survey that you may receive in the mail after you visit with us. Thank you!        Patient Information     Date Of Birth          1990        Designated Caregiver       Most Recent Value    Caregiver    Will someone help with your care after discharge? no      About your hospital stay     You were admitted on:  January 7, 2018 You last received care in the:  [facility]    You were discharged on:  January 7, 2018       Who to Call     For medical emergencies, please call 911.  For non-urgent questions about your medical care, please call your primary care provider or clinic, [phone]          Attending Provider     Provider Specialty    Liliya Fields APRN CNM MIDWIFE       Primary Care Provider Office Phone #    [facility] [phone]      Your next 10 appointments already scheduled     Jan 11, 2018  3:30 PM CST   (Arrive by 3:15 PM)   ANDRE ORTIZ SINGLE with [id number]   [facility] - [facility])    [address]  [facility] [address] [phone]            Jan 11, 2018  4:00 PM CST   Radiology MD with UR ANDRE PADILLA   [facility])    [address]  [facility] [address]   [phone]           Please arrive at the time given for your first appointment. This visit is used internally to schedule the physician's time during your ultrasound.            Jan 18, 2018  3:30 PM CST   (Arrive by 3:15 PM)   ANDRE ORTIZ SINGLE with  [email or web address]   [facility]trasound - [place] ([facility])    [address]  [facility] [address]   [phone]            Jan 18, 2018  4:00 PM CST   Radiology MD with ANH POWELL MD   [facility] - [place] ([facility])    [address]  [facility] [address]   [phone]           Please arrive at the time given for your first appointment. This visit is used internally to schedule the physician's time during your ultrasound.            Jan 25, 2018 11:45 AM CST   (Arrive by 11:30 AM)   MERYL US COMPRE SINGLE F/U with [id number]   [facility]trasound - [facility])    [address]  [facility] [address]   [phone]           Wear comfortable clothes and leave your valuables at home.            Jan 25, 2018 12:15 PM CST   Radiology MD with ANH POWELL MD   [facility] - [facility])    [address]  [facility] [address]   [phone]           Please arrive at the time given for your first appointment. This visit is used internally to schedule the physician's time during your ultrasound.              Further instructions from your care team       Discharge Instruction for Undelivered Patients          Diet:   Regular     Activity:  As tolerated.      Call your provider if you notice:  Swelling in your face or increased swelling in your hands or legs.  Headaches that are not relieved by Tylenol (acetaminophen).  Changes in your vision (blurring: seeing spots or stars.)  Nausea (sick to your stomach) and vomiting (throwing up).   Weight gain of 5 pounds or more per week.  Heartburn that doesn't go away.  Signs of bladder infection: pain when you urinate (use the toilet), need to go more often and more urgently.  The bag of davalos (rupture  of membranes) breaks, or you notice leaking in your underwear.  Bright red blood in your underwear.  Abdominal (lower belly) or stomach pain.  For first baby: Contractions (tightening) less than 5 minutes apart for one hour or more.  Second (plus) baby: Contractions (tightening) less than 10 minutes apart and getting stronger.  *If less than 34 weeks: Contractions (tightenings) more than 6 times in one hour.  Increase or change in vaginal discharge (note the color and amount)      Follow-up:  As scheduled with your clinic for routine prenatal care. Please call your clinic if you have any concerns.          Kick Counts  It s normal to worry about your baby s health. One way you can know your baby s doing well is to record the baby s movements once a day. This is called a kick count. You will usually feel your baby move by the 20th week of pregnancy. Remember to take your kick count records to all your appointments with your healthcare provider.       How to Count Kicks    Choose a time when the baby is active, such as after a meal.     Sit comfortably or lie on your side.     The first time the baby moves, write down the time.     Count each movement until the baby has moved 10 times. This can take from 20 minutes to 2 hours.     If the baby hasn t moved 4 times in 1 hour, pat your stomach to wake the baby up.    Write down the time you feel the baby s 10th movement.    Try to do it at the same time each day.  When to Call Your Healthcare Provider  Call your healthcare provider right away if you notice any of the following:    Your baby moves fewer than 10 times in 2 hours while you re doing kick counts.    Your baby moves much less often than on the days before.    You have not felt your baby move all day.    2683-7020 [employer]. [address] [place] [address], [place] [address]. All rights reserved. This information is not intended as a substitute for professional medical care. Always follow your healthcare  "professional's instructions.  This information has been modified by your health care provider with permission from the publisher.      Pending Results     Date and Time Order Name Status Description    2018 0911 Group B strep PCR In process     2018 0911 Urine Culture Aerobic Bacterial Preliminary     2018 0850 Cannabinoids qualitative confirm urine In process             Admission Information     Date & Time Provider Department Dept. Phone    2018 Liliya Fields, ISACC CNM [facility] [phone]      Your Vitals Were     Blood Pressure Temperature Respirations Last Period          107/51 98.3  F (36.8  C) (Oral) 20 2017 (Approximate)        MyChart Information     [employer] lets you send messages to your doctor, view your test results, renew your prescriptions, schedule appointments and more. To sign up, go to [email or web address] . Click on \"Log in\" on the left side of the screen, which will take you to the Welcome page. Then click on \"Sign up Now\" on the right side of the page.     You will be asked to enter the access code listed below, as well as some personal information. Please follow the directions to create your username and password.     Your access code is: [email or web address]  Expires: 2018 11:06 AM     Your access code will  in 90 days. If you need help or a new code, please call your [place] clinic or [phone].        Care EveryWhere ID     This is your Care EveryWhere ID. This could be used by other organizations to access your [place] medical records  [id number]        Equal Access to Services     [facility]: Hadii anabell suarez hadlaloo Soraisaali, waaxda luqadaha, qaybta kaalmada shelly, waxricki idiin hayaan adeeg kharash la'aan . So [facility] [phone].    ATENCIÓN: Si habla español, tiene a slater disposición servicios gratuitos de asistencia lingüística. Llame al [phone].    We comply with applicable federal civil rights laws and [place] laws. We do not " discriminate on the basis of race, color, national origin, age, disability, sex, sexual orientation, or gender identity.               Review of your medicines      UNREVIEWED medicines. Ask your doctor about these medicines        Dose / Directions    acetaminophen 500 MG Caps        Dose:  2 tablet   Take 2 tablets by mouth every 6 hours as needed   Refills:  0       prenatal multivitamin plus iron 27-0.8 MG Tabs per tablet        Dose:  1 tablet   Take 1 tablet by mouth daily   Refills:  0       traMADol 50 MG tablet   Commonly known as:  ULTRAM        Dose:   mg   Take 1-2 tablets ( mg) by mouth every 6 hours as needed for pain   Quantity:  15 tablet   Refills:  0                Protect others around you: Learn how to safely use, store and throw away your medicines at www.disposemymeds.org.             Medication List: This is a list of all your medications and when to take them. Check marks below indicate your daily home schedule. Keep this list as a reference.      Medications           Morning Afternoon Evening Bedtime As Needed    acetaminophen 500 MG Caps   Take 2 tablets by mouth every 6 hours as needed                                prenatal multivitamin plus iron 27-0.8 MG Tabs per tablet   Take 1 tablet by mouth daily                                traMADol 50 MG tablet   Commonly known as:  ULTRAM   Take 1-2 tablets ( mg) by mouth every 6 hours as needed for pain

## 2018-01-07 NOTE — PLAN OF CARE
D: Patient feeling much better after 1 liter of fluid, IV discontinued. Cervix is 1 cm and is having occasional contractions. Patient to follow up Thursday at [facility] clinic and will call [facility] tomorrow to arrange an appointment. Discharge home ambulatory.

## 2018-01-07 NOTE — PLAN OF CARE
D: Patient arrived to labor and delivery via ambulance. States around midnight she started developing a back ache and felt like she was getting sick. She would up around 0600 with a terrible stomach ache and has had loose stool X3. EMT started an IV that is running at [facility]. Admission completed, placed on the monitor, and Eliceo Fields CNM notified of patient arrival. P: Continue to monitor.

## 2018-01-07 NOTE — DISCHARGE INSTRUCTIONS
Discharge Instruction for Undelivered Patients          Diet:   Regular     Activity:  As tolerated.      Call your provider if you notice:  Swelling in your face or increased swelling in your hands or legs.  Headaches that are not relieved by Tylenol (acetaminophen).  Changes in your vision (blurring: seeing spots or stars.)  Nausea (sick to your stomach) and vomiting (throwing up).   Weight gain of 5 pounds or more per week.  Heartburn that doesn't go away.  Signs of bladder infection: pain when you urinate (use the toilet), need to go more often and more urgently.  The bag of davalos (rupture of membranes) breaks, or you notice leaking in your underwear.  Bright red blood in your underwear.  Abdominal (lower belly) or stomach pain.  For first baby: Contractions (tightening) less than 5 minutes apart for one hour or more.  Second (plus) baby: Contractions (tightening) less than 10 minutes apart and getting stronger.  *If less than 34 weeks: Contractions (tightenings) more than 6 times in one hour.  Increase or change in vaginal discharge (note the color and amount)      Follow-up:  As scheduled with your clinic for routine prenatal care. Please call your clinic if you have any concerns.          Kick Counts  It s normal to worry about your baby s health. One way you can know your baby s doing well is to record the baby s movements once a day. This is called a kick count. You will usually feel your baby move by the 20th week of pregnancy. Remember to take your kick count records to all your appointments with your healthcare provider.       How to Count Kicks    Choose a time when the baby is active, such as after a meal.     Sit comfortably or lie on your side.     The first time the baby moves, write down the time.     Count each movement until the baby has moved 10 times. This can take from 20 minutes to 2 hours.     If the baby hasn t moved 4 times in 1 hour, pat your stomach to wake the baby up.    Write down  the time you feel the baby s 10th movement.    Try to do it at the same time each day.  When to Call Your Healthcare Provider  Call your healthcare provider right away if you notice any of the following:    Your baby moves fewer than 10 times in 2 hours while you re doing kick counts.    Your baby moves much less often than on the days before.    You have not felt your baby move all day.    5904-3423 [employer]. [address] [place] [address], [place] [address]. All rights reserved. This information is not intended as a substitute for professional medical care. Always follow your healthcare professional's instructions.  This information has been modified by your health care provider with permission from the publisher.

## 2018-01-07 NOTE — IP AVS SNAPSHOT
[facility]    [address]    [facility] [address]    Phone:  [phone]                                       After Visit Summary   1/7/2018    Keyona Fisher    MRN: [id number]           After Visit Summary Signature Page     I have received my discharge instructions, and my questions have been answered. I have discussed any challenges I see with this plan with the nurse or doctor.    ..........................................................................................................................................  Patient/Patient Representative Signature      ..........................................................................................................................................  Patient Representative Print Name and Relationship to Patient    ..................................................               ................................................  Date                                            Time    ..........................................................................................................................................  Reviewed by Signature/Title    ...................................................              ..............................................  Date                                                            Time
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
## 2018-01-08 ENCOUNTER — TELEPHONE (OUTPATIENT)
Dept: OBGYN | Facility: CLINIC | Age: 28
End: 2018-01-08

## 2018-01-08 LAB
BACTERIA SPEC CULT: NORMAL
GP B STREP DNA SPEC QL NAA+PROBE: NEGATIVE
Lab: NORMAL
SPECIMEN SOURCE: NORMAL
SPECIMEN SOURCE: NORMAL

## 2018-01-08 NOTE — TELEPHONE ENCOUNTER
----- Message from Liliya Fields, ISACC CNM sent at 1/7/2018 11:35 AM CST -----  I saw this [facility] pt in triage on Sunday and she is missing HIV from prenatal labs. Could you contact them when you get a chance to have them resend labs to L and D for delivery? Thanks, ani

## 2018-01-08 NOTE — TELEPHONE ENCOUNTER
Left vm with nurse at [facility] to let them know Keyona was at the hospital over the weekend and her HIV results from this pregnancy were not available.

## 2018-01-11 ENCOUNTER — OFFICE VISIT (OUTPATIENT)
Dept: MATERNAL FETAL MEDICINE | Facility: CLINIC | Age: 28
End: 2018-01-11
Attending: OBSTETRICS & GYNECOLOGY
Payer: COMMERCIAL

## 2018-01-11 ENCOUNTER — HOSPITAL ENCOUNTER (OUTPATIENT)
Dept: ULTRASOUND IMAGING | Facility: CLINIC | Age: 28
Discharge: HOME | End: 2018-01-11
Attending: OBSTETRICS & GYNECOLOGY | Admitting: OBSTETRICS & GYNECOLOGY
Payer: COMMERCIAL

## 2018-01-11 DIAGNOSIS — O28.8 AFI (AMNIOTIC FLUID INDEX) BORDERLINE LOW: ICD-10-CM

## 2018-01-11 DIAGNOSIS — E66.01 MORBID OBESITY (H): ICD-10-CM

## 2018-01-11 DIAGNOSIS — O28.8 AFI (AMNIOTIC FLUID INDEX) BORDERLINE LOW: Primary | ICD-10-CM

## 2018-01-11 DIAGNOSIS — F19.90 SUBSTANCE USE DISORDER: ICD-10-CM

## 2018-01-11 PROCEDURE — 59025 FETAL NON-STRESS TEST: CPT | Mod: ZF

## 2018-01-11 PROCEDURE — 76818 FETAL BIOPHYS PROFILE W/NST: CPT

## 2018-01-11 NOTE — NURSING NOTE
NST Performed due to BPP 6/8.  See Imaging Tab for results.  and Dr. Villanueva reviewed efm tracing. See NST/BPP Doc Flowsheet tab. NST equivocal. Plan for pt to return on Friday 1/12 at 1500 for repeat BPP. Patient agrees to plan. Fetal Kick Counts reviewed with pt. Pt. States understanding.  Abbi Herman RN

## 2018-01-11 NOTE — PROGRESS NOTES
Please refer to ultrasound report under 'Imaging' Studies of 'Chart Review' tabs.    Gaurav Vaz M.D.

## 2018-01-12 ENCOUNTER — HOSPITAL ENCOUNTER (OUTPATIENT)
Dept: ULTRASOUND IMAGING | Facility: CLINIC | Age: 28
Discharge: HOME | End: 2018-01-12
Attending: OBSTETRICS & GYNECOLOGY | Admitting: OBSTETRICS & GYNECOLOGY
Payer: COMMERCIAL

## 2018-01-12 ENCOUNTER — OFFICE VISIT (OUTPATIENT)
Dept: MATERNAL FETAL MEDICINE | Facility: CLINIC | Age: 28
End: 2018-01-12
Attending: OBSTETRICS & GYNECOLOGY
Payer: COMMERCIAL

## 2018-01-12 DIAGNOSIS — F19.90 SUBSTANCE USE DISORDER: ICD-10-CM

## 2018-01-12 DIAGNOSIS — F19.90 SUBSTANCE USE DISORDER: Primary | ICD-10-CM

## 2018-01-12 DIAGNOSIS — O28.8 AFI (AMNIOTIC FLUID INDEX) BORDERLINE LOW: ICD-10-CM

## 2018-01-12 DIAGNOSIS — E66.01 MORBID OBESITY (H): ICD-10-CM

## 2018-01-12 LAB — THC UR QL CFM: NORMAL

## 2018-01-12 PROCEDURE — 76819 FETAL BIOPHYS PROFIL W/O NST: CPT

## 2018-01-12 NOTE — MR AVS SNAPSHOT
After Visit Summary   1/12/2018    Keyona Fisher    MRN: [id number]           Patient Information     Date Of Birth          1990        Visit Information        Provider Department      1/12/2018 3:30 PM Gaurav Vaz MD [facility] Maternal Fetal Medicine - [place]        Today's Diagnoses     Substance use disorder    -  1    Morbid obesity (H)        FRANCESCA (amniotic fluid index) borderline low           Follow-ups after your visit        Your next 10 appointments already scheduled     Jan 18, 2018  3:30 PM CST   (Arrive by 3:15 PM)   ANDRE ORTIZ SINGLE with URMFMUSR1   [facility] Maternal Fetal Medicine Ultrasound - [facility] [place])    [address]  [facility] [address]   [phone]            Jan 18, 2018  4:00 PM CST   Radiology MD with UR ANDRE PDAILLA   [facility] Maternal Fetal Medicine - [facility] [place])    [address]  [facility] [address]   [phone]           Please arrive at the time given for your first appointment. This visit is used internally to schedule the physician's time during your ultrasound.            Jan 25, 2018 11:45 AM CST   (Arrive by 11:30 AM)   ANDRE JUARES COMPRE SINGLE F/U with URMFMUSR3   [facility] Maternal Fetal Medicine Ultrasound - [place] ([facility])    [address]  [facility] [address]   [phone]           Wear comfortable clothes and leave your valuables at home.            Jan 25, 2018 12:15 PM CST   Radiology MD with UR ANDRE PADILLA   [facility] Maternal Fetal Medicine - [facility] [place])    [address]  [facility] [address]   [phone]           Please arrive at the time given for your first appointment. This visit is used internally to schedule the physician's time during your ultrasound.              Future tests that were ordered for you today     Open Future  "Orders        Priority Expected Expires Ordered    MFM BPP Single Routine  2018    Maternal Fetal BPP with NST Single Routine 2018            Who to contact     If you have questions or need follow up information about today's clinic visit or your schedule please contact [facility] MATERNAL FETAL MEDICINE [facility] directly at [phone].  Normal or non-critical lab and imaging results will be communicated to you by [employer], letter or phone within 4 business days after the clinic has received the results. If you do not hear from us within 7 days, please contact the clinic through [employer] or phone. If you have a critical or abnormal lab result, we will notify you by phone as soon as possible.  Submit refill requests through Oakmonkey or call your pharmacy and they will forward the refill request to us. Please allow 3 business days for your refill to be completed.          Additional Information About Your Visit        [employer] Information     Oakmonkey lets you send messages to your doctor, view your test results, renew your prescriptions, schedule appointments and more. To sign up, go to [email or web address] . Click on \"Log in\" on the left side of the screen, which will take you to the Welcome page. Then click on \"Sign up Now\" on the right side of the page.     You will be asked to enter the access code listed below, as well as some personal information. Please follow the directions to create your username and password.     Your access code is: [email or web address]  Expires: 2018 11:06 AM     Your access code will  in 90 days. If you need help or a new code, please call your [place] clinic or [phone].        Care EveryWhere ID     This is your Care EveryWhere ID. This could be used by other organizations to access your [place] medical records  [id number]        Your Vitals Were     Last Period                   2017 (Approximate)            Blood Pressure from " Last 3 Encounters:   01/07/18 107/51   04/19/17 (!) 123/96   01/01/17 139/85    Weight from Last 3 Encounters:   04/19/17 124 kg (273 lb 6.4 oz)   12/16/16 108.9 kg (240 lb)              Today, you had the following     No orders found for display       Primary Care Provider Office Phone #    [facility] [phone] [address] [facility] [address]        Equal Access to Services     JAMIE COHN : Hadii aad ku hadasho Soomaali, waaxda luqadaha, qaybta kaalmada adeegyada, waxay idiin hayaan adeeg kharash laaz . So [facility] [phone].    ATENCIÓN: Si habla español, tiene a salter disposición servicios gratuitos de asistencia lingüística. [facility] [phone].    We comply with applicable federal civil rights laws and [place] laws. We do not discriminate on the basis of race, color, national origin, age, disability, sex, sexual orientation, or gender identity.            Thank you!     Thank you for choosing [facility]  for your care. Our goal is always to provide you with excellent care. Hearing back from our patients is one way we can continue to improve our services. Please take a few minutes to complete the written survey that you may receive in the mail after your visit with us. Thank you!             Your Updated Medication List - Protect others around you: Learn how to safely use, store and throw away your medicines at www.disposemymeds.org.          This list is accurate as of: 1/12/18  4:30 PM.  Always use your most recent med list.                   Brand Name Dispense Instructions for use Diagnosis    acetaminophen 500 MG Caps      Take 2 tablets by mouth every 6 hours as needed        prenatal multivitamin plus iron 27-0.8 MG Tabs per tablet      Take 1 tablet by mouth daily        traMADol 50 MG tablet    ULTRAM    15 tablet    Take 1-2 tablets ( mg) by mouth every 6 hours as needed for pain

## 2018-01-19 ENCOUNTER — OFFICE VISIT (OUTPATIENT)
Dept: MATERNAL FETAL MEDICINE | Facility: CLINIC | Age: 28
End: 2018-01-19
Attending: OBSTETRICS & GYNECOLOGY
Payer: COMMERCIAL

## 2018-01-19 ENCOUNTER — HOSPITAL ENCOUNTER (OUTPATIENT)
Dept: ULTRASOUND IMAGING | Facility: CLINIC | Age: 28
Discharge: HOME | End: 2018-01-19
Attending: OBSTETRICS & GYNECOLOGY | Admitting: OBSTETRICS & GYNECOLOGY
Payer: COMMERCIAL

## 2018-01-19 DIAGNOSIS — O28.8 AFI (AMNIOTIC FLUID INDEX) BORDERLINE LOW: ICD-10-CM

## 2018-01-19 DIAGNOSIS — O99.213 OBESITY AFFECTING PREGNANCY IN THIRD TRIMESTER: Primary | ICD-10-CM

## 2018-01-19 DIAGNOSIS — O99.323 DRUG USE AFFECTING PREGNANCY IN THIRD TRIMESTER: ICD-10-CM

## 2018-01-19 PROCEDURE — 76819 FETAL BIOPHYS PROFIL W/O NST: CPT

## 2018-01-19 NOTE — PROGRESS NOTES
"Please see \"Imaging\" tab under \"Chart Review\" for details of today's US at the [facility].    Devin Mccormack MD  Maternal-Fetal Medicine      "

## 2018-01-19 NOTE — MR AVS SNAPSHOT
After Visit Summary   1/19/2018    Keyona Fisher    MRN: [id number]           Patient Information     Date Of Birth          1990        Visit Information        Provider Department      1/19/2018 2:30 PM Devin Mccormack MD [facility] Maternal Fetal Medicine [facility]        Today's Diagnoses     Obesity affecting pregnancy in third trimester    -  1    Drug use affecting pregnancy in third trimester           Follow-ups after your visit        Your next 10 appointments already scheduled     Jan 25, 2018 11:45 AM CST   (Arrive by 11:30 AM)   MFM US COMPRE SINGLE F/U with [id number]   [facility] Maternal Fetal Medicine Ultrasound - [place] ([facility])    [address]  [facility] [address] [phone]           Wear comfortable clothes and leave your valuables at home.            Jan 25, 2018 12:15 PM CST   Radiology MD with UR ANDRE PADILLA   [facility] Maternal Fetal Medicine - [facility])    [address]  [facility] [address]   [phone]           Please arrive at the time given for your first appointment. This visit is used internally to schedule the physician's time during your ultrasound.              Who to contact     If you have questions or need follow up information about today's clinic visit or your schedule please contact [facility] MATERNAL FETAL MEDICINE [facility] directly at [phone].  Normal or non-critical lab and imaging results will be communicated to you by MyChart, letter or phone within 4 business days after the clinic has received the results. If you do not hear from us within 7 days, please contact the clinic through MyChart or phone. If you have a critical or abnormal lab result, we will notify you by phone as soon as possible.  Submit refill requests through Avisena or call your pharmacy and they will forward the refill request to us. Please allow 3 business days  "for your refill to be completed.          Additional Information About Your Visit        MyChart Information     R[employer]Bits lets you send messages to your doctor, view your test results, renew your prescriptions, schedule appointments and more. To sign up, go to [email or web address] . Click on \"Log in\" on the left side of the screen, which will take you to the Welcome page. Then click on \"Sign up Now\" on the right side of the page.     You will be asked to enter the access code listed below, as well as some personal information. Please follow the directions to create your username and password.     Your access code is: [email or web address]  Expires: 2018 11:06 AM     Your access code will  in 90 days. If you need help or a new code, please call your [place] clinic or [phone].        Care EveryWhere ID     This is your Care EveryWhere ID. This could be used by other organizations to access your [place] medical records  [id number]        Your Vitals Were     Last Period                   2017 (Approximate)            Blood Pressure from Last 3 Encounters:   18 107/51   17 (!) 123/96   17 139/85    Weight from Last 3 Encounters:   17 124 kg (273 lb 6.4 oz)   16 108.9 kg (240 lb)              Today, you had the following     No orders found for display       Primary Care Provider Office Phone #    [facility] [phone]       [address] [facility] [address]        Equal Access to Services     DELTA COHN AH: Hadii aad ku hadasho Soomaali, waaxda luqadaha, qaybta kaalmada adeegyada, waxay black hernandez. So [facility] [phone].    ATENCIÓN: Si habla español, tiene a slater disposición servicios gratuitos de asistencia lingüística. Llame al [phone].    We comply with applicable federal civil rights laws and [place] laws. We do not discriminate on the basis of race, color, national origin, age, disability, sex, sexual " orientation, or gender identity.            Thank you!     Thank you for choosing [facility]  for your care. Our goal is always to provide you with excellent care. Hearing back from our patients is one way we can continue to improve our services. Please take a few minutes to complete the written survey that you may receive in the mail after your visit with us. Thank you!             Your Updated Medication List - Protect others around you: Learn how to safely use, store and throw away your medicines at www.disposemymeds.org.          This list is accurate as of: 1/19/18  3:28 PM.  Always use your most recent med list.                   Brand Name Dispense Instructions for use Diagnosis    acetaminophen 500 MG Caps      Take 2 tablets by mouth every 6 hours as needed        prenatal multivitamin plus iron 27-0.8 MG Tabs per tablet      Take 1 tablet by mouth daily        traMADol 50 MG tablet    ULTRAM    15 tablet    Take 1-2 tablets ( mg) by mouth every 6 hours as needed for pain

## 2018-01-25 ENCOUNTER — HOSPITAL ENCOUNTER (OUTPATIENT)
Facility: CLINIC | Age: 28
Discharge: HOME | End: 2018-01-25
Attending: ADVANCED PRACTICE MIDWIFE | Admitting: ADVANCED PRACTICE MIDWIFE
Payer: COMMERCIAL

## 2018-01-25 VITALS — DIASTOLIC BLOOD PRESSURE: 58 MMHG | RESPIRATION RATE: 20 BRPM | TEMPERATURE: 98 F | SYSTOLIC BLOOD PRESSURE: 112 MMHG

## 2018-01-25 PROBLEM — Z22.330 GBS CARRIER: Status: ACTIVE | Noted: 2018-01-25

## 2018-01-25 PROBLEM — Z36.89 ENCOUNTER FOR TRIAGE IN PREGNANT PATIENT: Status: ACTIVE | Noted: 2018-01-25

## 2018-01-25 LAB
AMPHETAMINES UR QL SCN: NEGATIVE
CANNABINOIDS UR QL: ABNORMAL
COCAINE UR QL: NEGATIVE
OPIATES UR QL SCN: NEGATIVE
PCP UR QL SCN: NEGATIVE

## 2018-01-25 PROCEDURE — 59025 FETAL NON-STRESS TEST: CPT

## 2018-01-25 PROCEDURE — 80307 DRUG TEST PRSMV CHEM ANLYZR: CPT | Performed by: ADVANCED PRACTICE MIDWIFE

## 2018-01-25 PROCEDURE — 80349 CANNABINOIDS NATURAL: CPT | Performed by: ADVANCED PRACTICE MIDWIFE

## 2018-01-25 PROCEDURE — G0463 HOSPITAL OUTPT CLINIC VISIT: HCPCS | Mod: 25

## 2018-01-25 RX ORDER — ALUMINA, MAGNESIA, AND SIMETHICONE 2400; 2400; 240 MG/30ML; MG/30ML; MG/30ML
30 SUSPENSION ORAL
Status: DISCONTINUED | OUTPATIENT
Start: 2018-01-25 | End: 2018-01-25 | Stop reason: HOSPADM

## 2018-01-25 RX ORDER — ACETAMINOPHEN 325 MG/1
650-975 TABLET ORAL EVERY 4 HOURS PRN
Status: DISCONTINUED | OUTPATIENT
Start: 2018-01-25 | End: 2018-01-25 | Stop reason: HOSPADM

## 2018-01-25 RX ORDER — ONDANSETRON 2 MG/ML
4 INJECTION INTRAMUSCULAR; INTRAVENOUS EVERY 6 HOURS PRN
Status: DISCONTINUED | OUTPATIENT
Start: 2018-01-25 | End: 2018-01-25 | Stop reason: HOSPADM

## 2018-01-25 RX ORDER — HYDROXYZINE HYDROCHLORIDE 50 MG/1
50-100 TABLET, FILM COATED ORAL EVERY 6 HOURS PRN
Status: DISCONTINUED | OUTPATIENT
Start: 2018-01-25 | End: 2018-01-25 | Stop reason: HOSPADM

## 2018-01-25 NOTE — IP AVS SNAPSHOT
MRN:[id number]                      After Visit Summary   1/25/2018    Keyona Fisher    MRN: [id number]           Thank you!     Thank you for choosing [facility] for your care. Our goal is always to provide you with excellent care. Hearing back from our patients is one way we can continue to improve our services. Please take a few minutes to complete the written survey that you may receive in the mail after you visit with us. Thank you!        Patient Information     Date Of Birth          1990        Designated Caregiver       Most Recent Value    Caregiver    Will someone help with your care after discharge? no      About your hospital stay     You were admitted on:  January 25, 2018 You last received care in the:  [facility]    You were discharged on:  January 25, 2018       Who to Call     For medical emergencies, please call 911.  For non-urgent questions about your medical care, please call your primary care provider or clinic, [phone]          Attending Provider     Provider Specialty    Liliya Fields APRN CNM MIDWIFE    Cammy Cox APRN CNM Midwives       Primary Care Provider Office Phone #    [facility] [phone]      Your next 10 appointments already scheduled     Jan 25, 2018 11:45 AM CST   (Arrive by 11:30 AM)   ANDRE JUARES COMPRE SINGLE F/U with URMFMUSR3   [facility] Maternal [facility]etal Medicine Ultrasound - [facility])    [address]  [facility] [address] [phone]           Wear comfortable clothes and leave your valuables at home.            Jan 25, 2018 12:15 PM CST   Radiology MD with UR ANDRE PADILLA   [facility] - [facility])    [address]  [facility] [address]   [phone]           Please arrive at the time given for your first appointment. This visit is used internally to schedule the  physician's time during your ultrasound.              Further instructions from your care team       Discharge Instruction for Undelivered Patients      You were seen for: Labor Assessment  We Consulted: Eliceo Fields CNM      Diet:   As tolerated     Activity:  As tolerated     Call your provider if you notice:  Swelling in your face or increased swelling in your hands or legs.  Headaches that are not relieved by Tylenol (acetaminophen).  Changes in your vision (blurring: seeing spots or stars.)  Nausea (sick to your stomach) and vomiting (throwing up).   Weight gain of 5 pounds or more per week.  Heartburn that doesn't go away.  Signs of bladder infection: pain when you urinate (use the toilet), need to go more often and more urgently.  The bag of davalos (rupture of membranes) breaks, or you notice leaking in your underwear.  Bright red blood in your underwear.  Abdominal (lower belly) or stomach pain.  For first baby: Contractions (tightening) less than 5 minutes apart for one hour or more.  Second (plus) baby: Contractions (tightening) less than 10 minutes apart and getting stronger.  *If less than 34 weeks: Contractions (tightenings) more than 6 times in one hour.  Increase or change in vaginal discharge (note the color and amount)      Follow-up:  As scheduled in the clinic          Pending Results     Date and Time Order Name Status Description    1/25/2018 0300 Cannabinoids qualitative confirm urine In process             Statement of Approval     Ordered          01/25/18 0658  I have reviewed and agree with all the recommendations and orders detailed in this document.  EFFECTIVE NOW     Approved and electronically signed by:  Cammy Cox APRN CNM             Admission Information     Date & Time Provider Department Dept. Phone    1/25/2018 Cammy Cox APRN CNM [facility] [phone]      Your Vitals Were     Blood Pressure Temperature Respirations Last Period          112/58 98  F (36.7  " C) (Oral) 20 2017 (Approximate)        MyChart Information     Trov lets you send messages to your doctor, view your test results, renew your prescriptions, schedule appointments and more. To sign up, go to [email or web address] . Click on \"Log in\" on the left side of the screen, which will take you to the Welcome page. Then click on \"Sign up Now\" on the right side of the page.     You will be asked to enter the access code listed below, as well as some personal information. Please follow the directions to create your username and password.     Your access code is: [email or web address]  Expires: 2018 11:06 AM     Your access code will  in 90 days. If you need help or a new code, please call your [place] clinic or [phone].        Care EveryWhere ID     This is your Care EveryWhere ID. This could be used by other organizations to access your [place] medical records  [id number]        Equal Access to Services     JAMIE COHN : Haddelfino velezo Sorosalina, waaxda luqadaha, qaybta kaalmada aderodrigo, dilshad jimenez . So [facility] [phone].    ATENCIÓN: Si habla español, tiene a slater disposición servicios gratuitos de asistencia lingüística. Llame al [phone].    We comply with applicable federal civil rights laws and [place] laws. We do not discriminate on the basis of race, color, national origin, age, disability, sex, sexual orientation, or gender identity.               Review of your medicines      UNREVIEWED medicines. Ask your doctor about these medicines        Dose / Directions    acetaminophen 500 MG Caps        Dose:  2 tablet   Take 2 tablets by mouth every 6 hours as needed   Refills:  0       prenatal multivitamin plus iron 27-0.8 MG Tabs per tablet        Dose:  1 tablet   Take 1 tablet by mouth daily   Refills:  0       traMADol 50 MG tablet   Commonly known as:  ULTRAM        Dose:   mg   Take 1-2 tablets ( mg) by mouth every 6 hours as " needed for pain   Quantity:  15 tablet   Refills:  0                Protect others around you: Learn how to safely use, store and throw away your medicines at www.disposemymeds.org.             Medication List: This is a list of all your medications and when to take them. Check marks below indicate your daily home schedule. Keep this list as a reference.      Medications           Morning Afternoon Evening Bedtime As Needed    acetaminophen 500 MG Caps   Take 2 tablets by mouth every 6 hours as needed                                prenatal multivitamin plus iron 27-0.8 MG Tabs per tablet   Take 1 tablet by mouth daily                                traMADol 50 MG tablet   Commonly known as:  ULTRAM   Take 1-2 tablets ( mg) by mouth every 6 hours as needed for pain

## 2018-01-25 NOTE — PROGRESS NOTES
Subjective:  Patient has been sleeping off and on through the night.  Sound asleep when this writer entered the room.      Objective:  IA per RN WNL.  Ctx irregular  Cervical exam: 4/50/-3.  Unchanged from admission    Assessment:  Early vs. Prodromal labor    Plan:  Discussed lack of cervical change and recommend discharge to home.  Patient agrees with plan.    DC orders placed.  ISACC Arthur CNM

## 2018-01-25 NOTE — PROGRESS NOTES
Pt discharged to home.  D/c instructions reviewed and pt states understanding.  CARLOS Roy will see pt in regards to transportation home from the hospital.  Pt may use [facility] transportation, however, she does not have the card/number w her.  Pt is trying to reach her roommate as well for a ride home.  Call light is within reach for pt to contact me if any needs while waiting to be seen by SW.  Pt is comfortable.

## 2018-01-25 NOTE — H&P
ADMIT NOTE  =================  38w2d    Keyona Fisher is a 27 year old female with an Patient's last menstrual period was 2017 (approximate). and Estimated Date of Delivery: 2018 [facility] at 4:00 AM with in early labor.     HPI  ================  IHB pt had her membranes stripped in clinic today. Was 3.5/50/-1 today. No bleeding or ROM. Pt just started suboxone program at [facility], had been using oxycodone, marijuana and suboxone intermittently throughout pregnancy. Being followed by MFM for low FRANCESCA. Last BPP  with normal fluid.  Contractions- moderate, cramping and back pain  Fetal movement- active  ROM- no   Vaginal bleeding- none  GBS- negative 18 although IHB problem list shows pos GBS. Pt states she was not told she is GBS positive  FOB- is not involved,   Other labor support- her brother is coming. Kids are with a sitter    Weight gain- 251 - 259 lbs, Total weight gain- 8 lbs  Height- 63  BMI- 44  First prenatal visit at 21 weeks, Total visits- 7    PROBLEM LIST  =================  Patient Active Problem List    Diagnosis Date Noted     Encounter for triage in pregnant patient 2018     Priority: Medium     Substance use disorder 2018     Priority: Medium     Pos THC. Oxycodone and suboxone use in pregnancy  18- rx suboxone 8-2mg sublingual film from [facility]        labor 2018     Priority: Medium     High-risk pregnancy, unspecified trimester 2018     Priority: Medium     IHB pt, drug abuse, insufficient PNC. MISSING HIV FROM PRENATAL LABS_____  17- MFM - low normal FRANCESCA- 6.5, 28% growth, BPP 8/8. follow weekly  18- seen on BP for back pain and diarrhea- pos THC, UA neg/ UC pending___, GBS-neg_/ cx: 1/long/post/high/soft         HISTORIES  ============  Allergies   Allergen Reactions     Ibuprofen      History reviewed. No pertinent past medical history.  History reviewed. No pertinent surgical history..  No  family history on file.     States maternal sickle cell carrier on [facility] prenatal- pt denies. Her daughter is sickle cell carrier    Social History   Substance Use Topics     Smoking status: Current Every Day Smoker     Packs/day: 0.25     Smokeless tobacco: Never Used     Alcohol use No      Comment: occ     Obstetric History       T3      L3     SAB0   TAB0   Ectopic0   Multiple0   Live Births3       # Outcome Date GA Lbr [place]/2nd Weight Sex Delivery Anes PTL Lv   5 Current            4 AB            3 Term         OLE   2 Term         OLE   1 Term         OLE           LABS:   ===========  Prenatal Labs: per I[facility]  Opos  Rubella immune  RPR neg  HBsAg neg  HIV- NOT AVAILABLE  Rhogam not indicated   Lab Results   Component Value Date    ABO O 2010    RH  Pos 2010    AS Neg 2008    TREPAB Negative 2008    HGB 13.8 2017    HIV Negative 2008     Lab Results   Component Value Date    GBS Negative 2018     Other labs:  Results for orders placed or performed during the hospital encounter of 18 (from the past 24 hour(s))   Drug abuse scrn 7 UR (/) (RH, SH, UR)   Result Value Ref Range    Amphetamine Qual Urine Negative NEG^Negative    Cannabinoids Qual Urine Positive, sent to [employer] for confirmation (A) NEG^Negative    Cocaine Qual Urine Negative NEG^Negative    Opiates Qualitative Urine Negative NEG^Negative    Pcp Qual Urine Negative NEG^Negative       ROS  =========  Pt denies significant respiratory, cardiovacular, GI, or muscular/skeletalcomplaints.    See RN data base ROS.       PHYSICAL EXAM:  ===============  /58  Temp 98  F (36.7  C) (Oral)  Resp 20  LMP 2017 (Approximate)  General appearance: uncomfortable with contractions  GENERAL APPEARANCE: healthy, alert and no distress  RESP: lungs clear to auscultation - no rales, rhonchi or wheezes  CV: regular rates and rhythm, normal S1 S2, no S3 or S4 and no murmur,and no  varicosities  ABDOMEN:  soft, nontender, no epigastric pain  SKIN: no suspicious lesions or rashes  NEURO: Denies headache, blurred vision, other vision changes  PSYCH: mentation appears normal. and affect normal/bright  Legs: No edema     Abdomen: gravid, vertex fetus per Leopold's, non-tender between contractions.   Cephalic presentation confirmed by BSUS  EFW-  7 lbs.   CONTACTIONS: moderate, cramping, back pain and every 5 minutes  FETAL HEART TONES: continuous EFM- baseline 125 with moderate variability and positive accelerations. No decelerations.  PELVIC EXAM: 4/ 50%/ Posterior/ average/ -1   REDDY SCORE: NA  BLOODY SHOW: no   ROM:no  FLUID: none  AMNISURE: not done    ASSESSMENT:  ==============  IUP @ 38w2d admitted in early labor   NST REACTIVE  Fetal Heart Rate - category one  GBS- negative 1/7/18  Drug abuse in pregnancy, now on suboxone program through [facility]  Pos THC  Low FRANCESCA being followed by M- 1/19/17- BPP 8/8, FRANCESCA 13.5    PLAN:  ===========  Admit outpt for now  Encourage rest, reevaluate in 2 hours  Contact [facility]HB this AM to clarify GBS status and HIV status and sickle cell status   consult if active labor  ISACC Sheppard CNM

## 2018-01-25 NOTE — PLAN OF CARE
Data: Patient presented to [facility] at 0250.   Reason for maternal/fetal assessment per patient is Contractions  .  Patient is a . Prenatal record reviewed.      Obstetric History       T3      L3     SAB0   TAB0   Ectopic0   Multiple0   Live Births3       # Outcome Date GA Lbr Preston/2nd Weight Sex Delivery Anes PTL Lv   5 Current            4 AB            3 Term         OLE   2 Term         OLE   1 Term         OLE      . Medical history: History reviewed. No pertinent past medical history.. Gestational Age 38w2d. VSS. Fetal movement present. Patient denies  vaginal discharge, pelvic pressure, UTI symptoms, GI problems, vaginal bleeding, edema, headache, visual disturbances, epigastric or URQ pain, rupture of membranes.   Action: Verbal consent for EFM. Triage assessment completed. EFM applied for FHT. Uterine assessment for contractions. Fetal assessment: Presumed adequate fetal oxygenation documented (see flow record).   Response: Eliceo Fields CNM informed of pt. Arrival and chief complaint. Plan per provider is move to a room and recheck cervix in a couple hours. Hopefully pt. Can get some rest. Patient verbalized agreement with plan. Patient transferred to room 479 ambulatory, oriented to room and call light. Per EARLE, do intermittent monitoring so pt. Can try and get some rest. Will continue to monitor. Pt. Educated when to call for RN. Verbalizes understanding.

## 2018-01-25 NOTE — IP AVS SNAPSHOT
[facility]    [address]    [place] [address]    Phone:  [phone]                                       After Visit Summary   1/25/2018    Keyona Fisher    MRN: [id number]           After Visit Summary Signature Page     I have received my discharge instructions, and my questions have been answered. I have discussed any challenges I see with this plan with the nurse or doctor.    ..........................................................................................................................................  Patient/Patient Representative Signature      ..........................................................................................................................................  Patient Representative Print Name and Relationship to Patient    ..................................................               ................................................  Date                                            Time    ..........................................................................................................................................  Reviewed by Signature/Title    ...................................................              ..............................................  Date                                                            Time

## 2018-01-25 NOTE — DISCHARGE INSTRUCTIONS
Discharge Instruction for Undelivered Patients      You were seen for: Labor Assessment  We Consulted: Eliceo Fields CNM      Diet:   As tolerated     Activity:  As tolerated     Call your provider if you notice:  Swelling in your face or increased swelling in your hands or legs.  Headaches that are not relieved by Tylenol (acetaminophen).  Changes in your vision (blurring: seeing spots or stars.)  Nausea (sick to your stomach) and vomiting (throwing up).   Weight gain of 5 pounds or more per week.  Heartburn that doesn't go away.  Signs of bladder infection: pain when you urinate (use the toilet), need to go more often and more urgently.  The bag of davalos (rupture of membranes) breaks, or you notice leaking in your underwear.  Bright red blood in your underwear.  Abdominal (lower belly) or stomach pain.  For first baby: Contractions (tightening) less than 5 minutes apart for one hour or more.  Second (plus) baby: Contractions (tightening) less than 10 minutes apart and getting stronger.  *If less than 34 weeks: Contractions (tightenings) more than 6 times in one hour.  Increase or change in vaginal discharge (note the color and amount)      Follow-up:  As scheduled in the clinic

## 2018-01-26 ENCOUNTER — HOSPITAL ENCOUNTER (INPATIENT)
Facility: CLINIC | Age: 28
LOS: 2 days | Discharge: HOME | End: 2018-01-28
Attending: ADVANCED PRACTICE MIDWIFE | Admitting: ADVANCED PRACTICE MIDWIFE
Payer: COMMERCIAL

## 2018-01-26 ENCOUNTER — ANESTHESIA EVENT (OUTPATIENT)
Dept: OBGYN | Facility: CLINIC | Age: 28
End: 2018-01-26
Payer: COMMERCIAL

## 2018-01-26 ENCOUNTER — ANESTHESIA (OUTPATIENT)
Dept: OBGYN | Facility: CLINIC | Age: 28
End: 2018-01-26
Payer: COMMERCIAL

## 2018-01-26 PROBLEM — O42.90 LEAKAGE OF AMNIOTIC FLUID: Status: ACTIVE | Noted: 2018-01-26

## 2018-01-26 LAB
ABO + RH BLD: NORMAL
ABO + RH BLD: NORMAL
AMPHETAMINES UR QL SCN: NEGATIVE
BLD GP AB SCN SERPL QL: NORMAL
BLOOD BANK CMNT PATIENT-IMP: NORMAL
CANNABINOIDS UR QL: ABNORMAL
COCAINE UR QL: NEGATIVE
ERYTHROCYTE [DISTWIDTH] IN BLOOD BY AUTOMATED COUNT: 14.2 % (ref 10–15)
HCT VFR BLD AUTO: 36 % (ref 35–47)
HGB BLD-MCNC: 12.1 G/DL (ref 11.7–15.7)
MCH RBC QN AUTO: 28.4 PG (ref 26.5–33)
MCHC RBC AUTO-ENTMCNC: 33.6 G/DL (ref 31.5–36.5)
MCV RBC AUTO: 85 FL (ref 78–100)
OPIATES UR QL SCN: NEGATIVE
PCP UR QL SCN: NEGATIVE
PLATELET # BLD AUTO: 203 10E9/L (ref 150–450)
RBC # BLD AUTO: 4.26 10E12/L (ref 3.8–5.2)
SPECIMEN EXP DATE BLD: NORMAL
THC UR QL CFM: NORMAL
WBC # BLD AUTO: 9.8 10E9/L (ref 4–11)

## 2018-01-26 PROCEDURE — 3E0R3BZ INTRODUCTION OF ANESTHETIC AGENT INTO SPINAL CANAL, PERCUTANEOUS APPROACH: ICD-10-PCS | Performed by: ANESTHESIOLOGY

## 2018-01-26 PROCEDURE — 86850 RBC ANTIBODY SCREEN: CPT | Performed by: ADVANCED PRACTICE MIDWIFE

## 2018-01-26 PROCEDURE — 86780 TREPONEMA PALLIDUM: CPT | Performed by: ADVANCED PRACTICE MIDWIFE

## 2018-01-26 PROCEDURE — 25000128 H RX IP 250 OP 636: Performed by: ANESTHESIOLOGY

## 2018-01-26 PROCEDURE — 80307 DRUG TEST PRSMV CHEM ANLYZR: CPT | Performed by: ADVANCED PRACTICE MIDWIFE

## 2018-01-26 PROCEDURE — 86900 BLOOD TYPING SEROLOGIC ABO: CPT | Performed by: ADVANCED PRACTICE MIDWIFE

## 2018-01-26 PROCEDURE — 85027 COMPLETE CBC AUTOMATED: CPT | Performed by: ADVANCED PRACTICE MIDWIFE

## 2018-01-26 PROCEDURE — 80349 CANNABINOIDS NATURAL: CPT | Performed by: ADVANCED PRACTICE MIDWIFE

## 2018-01-26 PROCEDURE — 25000125 ZZHC RX 250: Performed by: ANESTHESIOLOGY

## 2018-01-26 PROCEDURE — 86901 BLOOD TYPING SEROLOGIC RH(D): CPT | Performed by: ADVANCED PRACTICE MIDWIFE

## 2018-01-26 PROCEDURE — G0463 HOSPITAL OUTPT CLINIC VISIT: HCPCS

## 2018-01-26 PROCEDURE — 25000125 ZZHC RX 250

## 2018-01-26 PROCEDURE — 12000030 ZZH R&B OB INTERMEDIATE UMMC

## 2018-01-26 PROCEDURE — 25000125 ZZHC RX 250: Performed by: ADVANCED PRACTICE MIDWIFE

## 2018-01-26 PROCEDURE — 72200001 ZZH LABOR CARE VAGINAL DELIVERY SINGLE

## 2018-01-26 PROCEDURE — 00HU33Z INSERTION OF INFUSION DEVICE INTO SPINAL CANAL, PERCUTANEOUS APPROACH: ICD-10-PCS | Performed by: ANESTHESIOLOGY

## 2018-01-26 PROCEDURE — 25000128 H RX IP 250 OP 636: Performed by: ADVANCED PRACTICE MIDWIFE

## 2018-01-26 RX ORDER — EPHEDRINE SULFATE 50 MG/ML
INJECTION, SOLUTION INTRAMUSCULAR; INTRAVENOUS; SUBCUTANEOUS
Status: DISCONTINUED
Start: 2018-01-26 | End: 2018-01-27 | Stop reason: HOSPADM

## 2018-01-26 RX ORDER — ACETAMINOPHEN 325 MG/1
650 TABLET ORAL EVERY 4 HOURS PRN
Status: DISCONTINUED | OUTPATIENT
Start: 2018-01-26 | End: 2018-01-26

## 2018-01-26 RX ORDER — OXYTOCIN 10 [USP'U]/ML
10 INJECTION, SOLUTION INTRAMUSCULAR; INTRAVENOUS
Status: DISCONTINUED | OUTPATIENT
Start: 2018-01-26 | End: 2018-01-26

## 2018-01-26 RX ORDER — NALOXONE HYDROCHLORIDE 0.4 MG/ML
.1-.4 INJECTION, SOLUTION INTRAMUSCULAR; INTRAVENOUS; SUBCUTANEOUS
Status: DISCONTINUED | OUTPATIENT
Start: 2018-01-26 | End: 2018-01-28 | Stop reason: HOSPADM

## 2018-01-26 RX ORDER — LANOLIN 100 %
OINTMENT (GRAM) TOPICAL
Status: DISCONTINUED | OUTPATIENT
Start: 2018-01-26 | End: 2018-01-28 | Stop reason: HOSPADM

## 2018-01-26 RX ORDER — OXYTOCIN/0.9 % SODIUM CHLORIDE 30/500 ML
1-24 PLASTIC BAG, INJECTION (ML) INTRAVENOUS CONTINUOUS
Status: DISCONTINUED | OUTPATIENT
Start: 2018-01-26 | End: 2018-01-26

## 2018-01-26 RX ORDER — OXYTOCIN 10 [USP'U]/ML
INJECTION, SOLUTION INTRAMUSCULAR; INTRAVENOUS
Status: DISCONTINUED
Start: 2018-01-26 | End: 2018-01-27 | Stop reason: HOSPADM

## 2018-01-26 RX ORDER — OXYTOCIN/0.9 % SODIUM CHLORIDE 30/500 ML
340 PLASTIC BAG, INJECTION (ML) INTRAVENOUS CONTINUOUS PRN
Status: DISCONTINUED | OUTPATIENT
Start: 2018-01-26 | End: 2018-01-28 | Stop reason: HOSPADM

## 2018-01-26 RX ORDER — LIDOCAINE HYDROCHLORIDE 10 MG/ML
INJECTION, SOLUTION EPIDURAL; INFILTRATION; INTRACAUDAL; PERINEURAL
Status: DISCONTINUED
Start: 2018-01-26 | End: 2018-01-27 | Stop reason: HOSPADM

## 2018-01-26 RX ORDER — OXYTOCIN/0.9 % SODIUM CHLORIDE 30/500 ML
100 PLASTIC BAG, INJECTION (ML) INTRAVENOUS CONTINUOUS
Status: DISCONTINUED | OUTPATIENT
Start: 2018-01-26 | End: 2018-01-28 | Stop reason: HOSPADM

## 2018-01-26 RX ORDER — OXYCODONE AND ACETAMINOPHEN 5; 325 MG/1; MG/1
1 TABLET ORAL
Status: DISCONTINUED | OUTPATIENT
Start: 2018-01-26 | End: 2018-01-26

## 2018-01-26 RX ORDER — NALBUPHINE HYDROCHLORIDE 10 MG/ML
2.5-5 INJECTION, SOLUTION INTRAMUSCULAR; INTRAVENOUS; SUBCUTANEOUS EVERY 6 HOURS PRN
Status: DISCONTINUED | OUTPATIENT
Start: 2018-01-26 | End: 2018-01-26

## 2018-01-26 RX ORDER — AMOXICILLIN 250 MG
2 CAPSULE ORAL 2 TIMES DAILY PRN
Status: DISCONTINUED | OUTPATIENT
Start: 2018-01-26 | End: 2018-01-28 | Stop reason: HOSPADM

## 2018-01-26 RX ORDER — OXYTOCIN 10 [USP'U]/ML
10 INJECTION, SOLUTION INTRAMUSCULAR; INTRAVENOUS
Status: DISCONTINUED | OUTPATIENT
Start: 2018-01-26 | End: 2018-01-28 | Stop reason: HOSPADM

## 2018-01-26 RX ORDER — SODIUM CHLORIDE, SODIUM LACTATE, POTASSIUM CHLORIDE, CALCIUM CHLORIDE 600; 310; 30; 20 MG/100ML; MG/100ML; MG/100ML; MG/100ML
INJECTION, SOLUTION INTRAVENOUS CONTINUOUS
Status: DISCONTINUED | OUTPATIENT
Start: 2018-01-26 | End: 2018-01-26

## 2018-01-26 RX ORDER — OXYTOCIN/0.9 % SODIUM CHLORIDE 30/500 ML
100-340 PLASTIC BAG, INJECTION (ML) INTRAVENOUS CONTINUOUS PRN
Status: COMPLETED | OUTPATIENT
Start: 2018-01-26 | End: 2018-01-26

## 2018-01-26 RX ORDER — LIDOCAINE 40 MG/G
CREAM TOPICAL
Status: DISCONTINUED | OUTPATIENT
Start: 2018-01-26 | End: 2018-01-26

## 2018-01-26 RX ORDER — AMOXICILLIN 250 MG
1 CAPSULE ORAL 2 TIMES DAILY PRN
Status: DISCONTINUED | OUTPATIENT
Start: 2018-01-26 | End: 2018-01-28 | Stop reason: HOSPADM

## 2018-01-26 RX ORDER — MISOPROSTOL 200 UG/1
400 TABLET ORAL
Status: DISCONTINUED | OUTPATIENT
Start: 2018-01-26 | End: 2018-01-28 | Stop reason: HOSPADM

## 2018-01-26 RX ORDER — FENTANYL/BUPIVACAINE/NS/PF 2-1250MCG
PLASTIC BAG, INJECTION (ML) INJECTION
Status: COMPLETED
Start: 2018-01-26 | End: 2018-01-26

## 2018-01-26 RX ORDER — CARBOPROST TROMETHAMINE 250 UG/ML
250 INJECTION, SOLUTION INTRAMUSCULAR
Status: DISCONTINUED | OUTPATIENT
Start: 2018-01-26 | End: 2018-01-26

## 2018-01-26 RX ORDER — OXYTOCIN/0.9 % SODIUM CHLORIDE 30/500 ML
PLASTIC BAG, INJECTION (ML) INTRAVENOUS
Status: COMPLETED
Start: 2018-01-26 | End: 2018-01-26

## 2018-01-26 RX ORDER — NALOXONE HYDROCHLORIDE 0.4 MG/ML
.1-.4 INJECTION, SOLUTION INTRAMUSCULAR; INTRAVENOUS; SUBCUTANEOUS
Status: DISCONTINUED | OUTPATIENT
Start: 2018-01-26 | End: 2018-01-26

## 2018-01-26 RX ORDER — FENTANYL CITRATE 50 UG/ML
INJECTION, SOLUTION INTRAMUSCULAR; INTRAVENOUS PRN
Status: DISCONTINUED | OUTPATIENT
Start: 2018-01-26 | End: 2018-01-27 | Stop reason: HOSPADM

## 2018-01-26 RX ORDER — EPHEDRINE SULFATE 50 MG/ML
5 INJECTION, SOLUTION INTRAMUSCULAR; INTRAVENOUS; SUBCUTANEOUS
Status: DISCONTINUED | OUTPATIENT
Start: 2018-01-26 | End: 2018-01-26

## 2018-01-26 RX ORDER — BUPRENORPHINE AND NALOXONE 8; 2 MG/1; MG/1
1 FILM, SOLUBLE BUCCAL; SUBLINGUAL DAILY
Status: DISCONTINUED | OUTPATIENT
Start: 2018-01-27 | End: 2018-01-27

## 2018-01-26 RX ORDER — FENTANYL CITRATE 50 UG/ML
50-100 INJECTION, SOLUTION INTRAMUSCULAR; INTRAVENOUS
Status: DISCONTINUED | OUTPATIENT
Start: 2018-01-26 | End: 2018-01-26

## 2018-01-26 RX ORDER — HYDROCORTISONE 2.5 %
CREAM (GRAM) TOPICAL 3 TIMES DAILY PRN
Status: DISCONTINUED | OUTPATIENT
Start: 2018-01-26 | End: 2018-01-28 | Stop reason: HOSPADM

## 2018-01-26 RX ORDER — MISOPROSTOL 200 UG/1
TABLET ORAL
Status: DISCONTINUED
Start: 2018-01-26 | End: 2018-01-27 | Stop reason: HOSPADM

## 2018-01-26 RX ORDER — METHYLERGONOVINE MALEATE 0.2 MG/ML
200 INJECTION INTRAVENOUS
Status: DISCONTINUED | OUTPATIENT
Start: 2018-01-26 | End: 2018-01-26

## 2018-01-26 RX ORDER — ACETAMINOPHEN 325 MG/1
650 TABLET ORAL EVERY 4 HOURS PRN
Status: DISCONTINUED | OUTPATIENT
Start: 2018-01-26 | End: 2018-01-28 | Stop reason: HOSPADM

## 2018-01-26 RX ORDER — BISACODYL 10 MG
10 SUPPOSITORY, RECTAL RECTAL DAILY PRN
Status: DISCONTINUED | OUTPATIENT
Start: 2018-01-28 | End: 2018-01-28 | Stop reason: HOSPADM

## 2018-01-26 RX ORDER — ONDANSETRON 2 MG/ML
4 INJECTION INTRAMUSCULAR; INTRAVENOUS EVERY 6 HOURS PRN
Status: DISCONTINUED | OUTPATIENT
Start: 2018-01-26 | End: 2018-01-26

## 2018-01-26 RX ORDER — PENICILLIN G POTASSIUM 5000000 [IU]/1
5 INJECTION, POWDER, FOR SOLUTION INTRAMUSCULAR; INTRAVENOUS ONCE
Status: COMPLETED | OUTPATIENT
Start: 2018-01-26 | End: 2018-01-26

## 2018-01-26 RX ADMIN — SODIUM CHLORIDE, POTASSIUM CHLORIDE, SODIUM LACTATE AND CALCIUM CHLORIDE: 600; 310; 30; 20 INJECTION, SOLUTION INTRAVENOUS at 17:15

## 2018-01-26 RX ADMIN — Medication 8 ML: at 21:29

## 2018-01-26 RX ADMIN — FENTANYL CITRATE 100 MCG: 50 INJECTION, SOLUTION INTRAMUSCULAR; INTRAVENOUS at 20:31

## 2018-01-26 RX ADMIN — Medication 2 MILLI-UNITS/MIN: at 19:19

## 2018-01-26 RX ADMIN — Medication 2.5 MILLION UNITS: at 21:35

## 2018-01-26 RX ADMIN — PENICILLIN G POTASSIUM 5 MILLION UNITS: 5000000 POWDER, FOR SOLUTION INTRAMUSCULAR; INTRAPLEURAL; INTRATHECAL; INTRAVENOUS at 17:15

## 2018-01-26 RX ADMIN — FENTANYL CITRATE 16 MCG: 50 INJECTION, SOLUTION INTRAMUSCULAR; INTRAVENOUS at 21:28

## 2018-01-26 RX ADMIN — Medication 10 ML/HR: at 21:31

## 2018-01-26 RX ADMIN — OXYTOCIN-SODIUM CHLORIDE 0.9% IV SOLN 30 UNIT/500ML 340 ML/HR: 30-0.9/5 SOLUTION at 23:09

## 2018-01-26 RX ADMIN — OXYTOCIN-SODIUM CHLORIDE 0.9% IV SOLN 30 UNIT/500ML 2 MILLI-UNITS/MIN: 30-0.9/5 SOLUTION at 19:19

## 2018-01-26 RX ADMIN — SODIUM CHLORIDE, POTASSIUM CHLORIDE, SODIUM LACTATE AND CALCIUM CHLORIDE 1000 ML: 600; 310; 30; 20 INJECTION, SOLUTION INTRAVENOUS at 20:45

## 2018-01-26 NOTE — PLAN OF CARE
Data: Patient presented to [facility] at 1515.   Reason for maternal/fetal assessment per patient is Rule out rupture of membranes  . Pt feeling contractions.   Patient is a . Prenatal record reviewed.      Obstetric History       T3      L3     SAB0   TAB0   Ectopic0   Multiple0   Live Births3       # Outcome Date GA Lbr Preston/2nd Weight Sex Delivery Anes PTL Lv   5 Current            4 AB            3 Term         OLE   2 Term         OLE   1 Term         OLE      Gestational Age 38w3d. VSS. Fetal movement present. Patient denies vaginal discharge, pelvic pressure, UTI symptoms, GI problems, bloody show, vaginal bleeding, edema, headache, visual disturbances, epigastric or URQ pain, abdominal pain.  Action: Verbal consent for EFM. Triage assessment completed. EFM applied for fetal surveillane. Uterine assessment per toco. Fetal assessment: Presumed adequate fetal oxygenation documented (see flow record).   Response: Shauna Jansen CNM informed of pt arrival and status. Plan per provider is admit to inpatient, IV antibiotics for GBS status. Patient verbalized agreement with plan. Patient transferred to room 471 ambulatory, oriented to room and call light.
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
## 2018-01-26 NOTE — IP AVS SNAPSHOT
MRN:[id number]                      After Visit Summary   1/26/2018    Keyona Fisher    MRN: [id number]           Thank you!     Thank you for choosing [facility] for your care. Our goal is always to provide you with excellent care. Hearing back from our patients is one way we can continue to improve our services. Please take a few minutes to complete the written survey that you may receive in the mail after you visit with us. Thank you!        Patient Information     Date Of Birth          1990        About your hospital stay     You were admitted on:  January 26, 2018 You last received care in the:  [facility]    You were discharged on:  January 28, 2018       Who to Call     For medical emergencies, please call 911.  For non-urgent questions about your medical care, please call your primary care provider or clinic, [phone]          Attending Provider     Provider Specialty    Shauna Jansen APRN CN [facility]    Gil Dai APRN CNM [facility]       Primary Care Provider Office Phone # Fax #    Becka Ferro [phone]      Your next 10 appointments already scheduled     Jan 29, 2018 11:45 AM CST   (Arrive by 11:30 AM)   ANDRE US COMPRE SINGLE F/U with [id number]   [facility])    [address]  [facility] [id number] [phone]           Wear comfortable clothes and leave your valuables at home.            Jan 29, 2018 12:15 PM CST   Radiology MD with  ANDRE PADILLA   [facility])    [address]  [facility] [address]   [phone]           Please arrive at the time given for your first appointment. This visit is used internally to schedule the physician's time during your ultrasound.              Further instructions from your care team       Postpartum Vaginal Delivery  Instructions    Activity       Ask family and friends for help when you need it.    Do not place anything in your vagina for 6 weeks.    You are not restricted on other activities, but take it easy for a few weeks to allow your body to recover from delivery.  You are able to do any activities you feel up to that point.    No driving until you have stopped taking your pain medications (usually two weeks after delivery).     Call your health care provider if you have any of these symptoms:       Increased pain, swelling, redness, or fluid around your stiches from an episiotomy or perineal tear.    A fever above 100.4 F (38 C) with or without chills when placing a thermometer under your tongue.    You soak a sanitary pad with blood within 1 hour, or you see blood clots larger than a golf ball.    Bleeding that lasts more than 6 weeks.    Vaginal discharge that smells bad.    Severe pain, cramping or tenderness in your lower belly area.    A need to urinate more frequently (use the toilet more often), more urgently (use the toilet very quickly), or it burns when you urinate.    Nausea and vomiting.    Redness, swelling or pain around a vein in your leg.    Problems breastfeeding or a red or painful area on your breast.    Chest pain and cough or are gasping for air.    Problems coping with sadness, anxiety, or depression.  If you have any concerns about hurting yourself or the baby, call your provider immediately.     You have questions or concerns after you return home.     Keep your hands clean:  Always wash your hands before touching your perineal area and stitches.  This helps reduce your risk of infection.  If your hands aren't dirty, you may use an alcohol hand-rub to clean your hands. Keep your nails clean and short.        Pending Results     Date and Time Order Name Status Description    1/26/2018 1530 Cannabinoids qualitative confirm urine In process             Admission Information     Date & Time Provider  "Department Dept. Phone    2018 Gil Dai APRN CNM [facility] [phone]      Your Vitals Were     Blood Pressure Pulse Temperature Respirations Last Period Pulse Oximetry    106/70 71 98.3  F (36.8  C) (Oral) 16 2017 (Approximate) 98%      MyChart Information     [employer] lets you send messages to your doctor, view your test results, renew your prescriptions, schedule appointments and more. To sign up, go to [email or web address] . Click on \"Log in\" on the left side of the screen, which will take you to the Welcome page. Then click on \"Sign up Now\" on the right side of the page.     You will be asked to enter the access code listed below, as well as some personal information. Please follow the directions to create your username and password.     Your access code is: [email or web address]  Expires: 2018 11:06 AM     Your access code will  in 90 days. If you need help or a new code, please call your [facility] clinic or [phone].        Care EveryWhere ID     This is your Care EveryWhere ID. This could be used by other organizations to access your [facility] medical records  [id number]        Equal Access to Services     JAMIE COHN : Candelario velezo Sorosalina, waaxda luqadaha, qaybta kaalmada adeegyada, dilshad hernandez. So [facility] [phone].    ATENCIÓN: Si habla español, tiene a slater disposición servicios gratuitos de asistencia lingüística. Licha al [phone].    We comply with applicable federal civil rights laws and [place] laws. We do not discriminate on the basis of race, color, national origin, age, disability, sex, sexual orientation, or gender identity.               Review of your medicines      START taking        Dose / Directions    acetaminophen 325 MG tablet   Commonly known as:  TYLENOL   Replaces:  acetaminophen 500 MG Caps        Dose:  650 mg   Take 2 tablets (650 mg) by mouth every 6 hours as needed for mild pain or fever (greater than or equal " to 38 C /100.4 F (oral))   Quantity:  100 tablet   Refills:  1       ferrous fumarate-vitamin C 65-25 MG CR tablet   Commonly known as:  GRANT-SEQUELS   Used for:  Postpartum anemia        Dose:  1 tablet   Start taking on:  1/29/2018   Take 1 tablet by mouth daily (with breakfast)   Quantity:  100 tablet   Refills:  1       senna-docusate 8.6-50 MG per tablet   Commonly known as:  SENOKOT-S;PERICOLACE        Dose:  2 tablet   Take 2 tablets by mouth 2 times daily as needed for constipation   Quantity:  100 tablet   Refills:  1         CONTINUE these medicines which have NOT CHANGED        Dose / Directions    prenatal multivitamin plus iron 27-0.8 MG Tabs per tablet        Dose:  1 tablet   Take 1 tablet by mouth daily   Quantity:  100 tablet   Refills:  1         STOP taking     acetaminophen 500 MG Caps   Replaced by:  acetaminophen 325 MG tablet           traMADol 50 MG tablet   Commonly known as:  ULTRAM                Where to get your medicines      These medications were sent to [facility] [address]  [address] [address]     Phone:  [phone]     acetaminophen 325 MG tablet    ferrous fumarate-vitamin C 65-25 MG CR tablet    prenatal multivitamin plus iron 27-0.8 MG Tabs per tablet    senna-docusate 8.6-50 MG per tablet                Protect others around you: Learn how to safely use, store and throw away your medicines at www.disposemymeds.org.             Medication List: This is a list of all your medications and when to take them. Check marks below indicate your daily home schedule. Keep this list as a reference.      Medications           Morning Afternoon Evening Bedtime As Needed    acetaminophen 325 MG tablet   Commonly known as:  TYLENOL   Take 2 tablets (650 mg) by mouth every 6 hours as needed for mild pain or fever (greater than or equal to 38 C /100.4 F (oral))   Last time this was given:  650 mg on 1/28/2018  7:30 AM                                 ferrous fumarate-vitamin C 65-25 MG CR tablet   Commonly known as:  GRANT-SEQUELS   Take 1 tablet by mouth daily (with breakfast)   Start taking on:  1/29/2018   Last time this was given:  1 tablet on 1/28/2018  9:01 AM                                prenatal multivitamin plus iron 27-0.8 MG Tabs per tablet   Take 1 tablet by mouth daily                                senna-docusate 8.6-50 MG per tablet   Commonly known as:  SENOKOT-S;PERICOLACE   Take 2 tablets by mouth 2 times daily as needed for constipation   Last time this was given:  2 tablets on 1/28/2018  7:30 AM

## 2018-01-27 ENCOUNTER — HEALTH MAINTENANCE LETTER (OUTPATIENT)
Age: 28
End: 2018-01-27

## 2018-01-27 LAB
HGB BLD-MCNC: 10.8 G/DL (ref 11.7–15.7)
T PALLIDUM IGG+IGM SER QL: NEGATIVE

## 2018-01-27 PROCEDURE — 25000132 ZZH RX MED GY IP 250 OP 250 PS 637: Performed by: ADVANCED PRACTICE MIDWIFE

## 2018-01-27 PROCEDURE — 85018 HEMOGLOBIN: CPT | Performed by: ADVANCED PRACTICE MIDWIFE

## 2018-01-27 PROCEDURE — 12000028 ZZH R&B OB UMMC

## 2018-01-27 PROCEDURE — 36416 COLLJ CAPILLARY BLOOD SPEC: CPT | Performed by: ADVANCED PRACTICE MIDWIFE

## 2018-01-27 RX ORDER — OXYCODONE HYDROCHLORIDE 5 MG/1
5 TABLET ORAL EVERY 4 HOURS PRN
Status: DISCONTINUED | OUTPATIENT
Start: 2018-01-27 | End: 2018-01-28 | Stop reason: HOSPADM

## 2018-01-27 RX ORDER — BUPRENORPHINE AND NALOXONE 4; 1 MG/1; MG/1
1 FILM, SOLUBLE BUCCAL; SUBLINGUAL ONCE
Status: COMPLETED | OUTPATIENT
Start: 2018-01-27 | End: 2018-01-27

## 2018-01-27 RX ORDER — BUPRENORPHINE AND NALOXONE 4; 1 MG/1; MG/1
3 FILM, SOLUBLE BUCCAL; SUBLINGUAL DAILY
Status: DISCONTINUED | OUTPATIENT
Start: 2018-01-28 | End: 2018-01-28 | Stop reason: HOSPADM

## 2018-01-27 RX ORDER — BUPRENORPHINE AND NALOXONE 8; 2 MG/1; MG/1
1 FILM, SOLUBLE BUCCAL; SUBLINGUAL DAILY
Status: DISCONTINUED | OUTPATIENT
Start: 2018-01-27 | End: 2018-01-27

## 2018-01-27 RX ADMIN — ACETAMINOPHEN 650 MG: 325 TABLET, FILM COATED ORAL at 05:00

## 2018-01-27 RX ADMIN — SENNOSIDES AND DOCUSATE SODIUM 2 TABLET: 8.6; 5 TABLET ORAL at 19:40

## 2018-01-27 RX ADMIN — SENNOSIDES AND DOCUSATE SODIUM 1 TABLET: 8.6; 5 TABLET ORAL at 08:39

## 2018-01-27 RX ADMIN — OXYCODONE HYDROCHLORIDE 5 MG: 5 TABLET ORAL at 02:23

## 2018-01-27 RX ADMIN — ACETAMINOPHEN 650 MG: 325 TABLET, FILM COATED ORAL at 00:44

## 2018-01-27 RX ADMIN — ACETAMINOPHEN 650 MG: 325 TABLET, FILM COATED ORAL at 18:54

## 2018-01-27 RX ADMIN — ACETAMINOPHEN 650 MG: 325 TABLET, FILM COATED ORAL at 08:39

## 2018-01-27 RX ADMIN — ACETAMINOPHEN 650 MG: 325 TABLET, FILM COATED ORAL at 15:04

## 2018-01-27 RX ADMIN — BUPRENORPHINE HYDROCHLORIDE, NALOXONE HYDROCHLORIDE 1 FILM: 8; 2 FILM, SOLUBLE BUCCAL; SUBLINGUAL at 10:41

## 2018-01-27 RX ADMIN — BUPRENORPHINE HYDROCHLORIDE, NALOXONE HYDROCHLORIDE 1 FILM: 4; 1 FILM, SOLUBLE BUCCAL; SUBLINGUAL at 12:57

## 2018-01-27 NOTE — PLAN OF CARE
Problem: Patient Care Overview  Goal: Plan of Care/Patient Progress Review  Outcome: No Change  Data: Vital signs and postpartum checks WDL - see flow record. Patient eating and drinking normally.  Patient performing self cares and is able to care for infant.  Action: Patient medicated during the shift for pain taking Tylenol and Oxycodone. Patient education done see education record.  Response: Positive attachment behaviors observed with infant. No support persons present at this time.    Plan: Continue with the plan of care and notify provider if decline in status. Will continue to monitor and assess.

## 2018-01-27 NOTE — PROGRESS NOTES
Blood pressure 100/57, temperature 98.6  F (37  C), temperature source Oral, resp. rate 16, last menstrual period 2017, SpO2 97 %, not currently breastfeeding.    General appearance: uncomfortable with contractions, have some relief from labor epidural but can still feel discomfort/cramping in lower abdomen and back, also reports feeling more rectal pressure  CONTRACTIONS: moderate and every 3 minutes  Pitocin- 4 mu/min.,  Antibiotics- PCN  FETAL HEART TONES: continuous EFM- baseline 125 with moderate variability and positive accelerations. No decelerations.  ROM: clear fluid  PELVIC EXAM: 8 / 90%/ -1    ASSESSMENT:  ==============  IUP @ 38w3d in active labor   Fetal Heart Rate Tracing category one  GBS- positive- adequately treated    PLAN:  ===========  Frequent position changes to facilitate labor with epidural anesthesia.  Reevaluate in 1-2 hours prn  Anticipate   Continue labor augmentation with Pitocin  MD consultant on call Dr. Norton/ available prn     ISACC Franco CNM

## 2018-01-27 NOTE — PROVIDER NOTIFICATION
01/27/18 1050   Provider Notification   Provider Name/Title Inderjit OG   Method of Notification Electronic Page   Request Evaluate in Person   Notification Reason Medication Request

## 2018-01-27 NOTE — PLAN OF CARE
Problem: Patient Care Overview  Goal: Plan of Care/Patient Progress Review  Outcome: Improving  Patient has been stable through out shift. States adequate pain control. Patient was seen by SW, see note. Patient has been very involved in infant's cares and independent with infant's feedings. Continue with plan of care.

## 2018-01-27 NOTE — PLAN OF CARE
Data: Keyona Fisher transferred to 7146 via wheelchair at 0145. Baby transferred via parent's arms.  Action: Receiving unit notified of transfer: Yes. Patient and family notified of room change. Report given to OPHELIA Martínez at 0130. Belongings sent to receiving unit. Accompanied by Registered Nurse. Oriented patient to surroundings. Call light within reach. ID bands double-checked with receiving RN.  Response: Patient tolerated transfer and is stable.

## 2018-01-27 NOTE — PLAN OF CARE
Problem: Patient Care Overview  Goal: Plan of Care/Patient Progress Review  Vaginal Delivery Note   of viable Male with Gil Dai CNM in attendance.  NICU/Nursery RN Micki present.  Infant with spontaneous cry, to mother's abdomen, dried and stimulated.  APGAR at 1 minute:  9 and APGAR at 5 minutes:  9.  Placenta delivered with out complication, no laceration, marck cares provided.  Mother and baby in stable condition.

## 2018-01-27 NOTE — PLAN OF CARE
Problem: Labor (Cervical Ripen, Induct, Augment) (Adult,Obstetrics,Pediatric)  Goal: Signs and Symptoms of Listed Potential Problems Will be Absent, Minimized or Managed (Labor)  Signs and symptoms of listed potential problems will be absent, minimized or managed by discharge/transition of care (reference Labor (Cervical Ripen, Induct, Augment) (Adult,Obstetrics,Pediatric) CPG).   Outcome: Completed Date Met: 18  VSS. Afebrile. Pitocin running at 4 mU. See flowsheets for FHR tracing. Epidural placed with moderate pain relief. Upon feeling pressure, CNM called to room and cervix found to be complete.  of viable Male with Gil Dai CNM in attendance.  Nursery RN Micki CONKLIN. present.  Infant with spontaneous cry, to mother's abdomen, dried and stimulated. Placenta delivered with out complication, IV pitocin started after delivery of infant. Report given to Cassandra SOUZA RN who assumes care.

## 2018-01-27 NOTE — L&D DELIVERY NOTE
DELIVERY NOTE:  Brief Labor Course: Pt presented to  with SROM and irregular contractions. Pitocin augmentation started around ~1900. Pt received 1 dose of IV fentanyl before having epidural placed. Reported increased rectal pressure and intense urge to push. Began pushing with excellent maternal effort.  Delivery Note:    of live male at 2308 in JOSE ELIAS position, shoulders delivered without difficulty over intact perineum. Baby placed immediately on mother's abdomen, dried and stimulated with vigorous cry. Cord clamped and cut once pulsing stopped, > 1 minute of life. Cord blood collected. IV pitocin running. Placenta delivered spontaneously, intact with 3VC, with Velázquez maneuver. Trailing membranes slowly teased out with ring forceps. Fundus midline and firm with massage. Upon inspection, perineum intact and no further lacerations. Cord segment obtained for study, pt informed. Placenta to patient.    IUP at 38 weeks gestation delivered on 2018.     delivery of a viable Male infant.  Weight: 6 lbs 10 oz  Apgars of 9 at 1 minute and 9 at 5 minutes.  Labor was augmented.  Medications administered  in labor:  Pain Rx Epidural; Antibiotics Yes: PCN and IV antibiotics infused greater than 4 hours; Other n/a  Perineum: Intact  Placenta-mechanism: spontaneous, intact,  with a 3 vessel cord. IV oxytocin was given After delivery of baby  Quantitative Blood Loss was 125cc.   Complications of labor and delivery: None  Anticipated Discharge Date: 18  Birth attendants: EARLE Terrell APRN CNM     Delivery Summary    Keyona Fisher MRN# [id number]   Age: 27 year old YOB: 1990     Labor Event Times:    Labor Onset Date       Labor Onset Time    Dilation Complete Date    Dilation Complete Time       Start Pushing Date        Start Pushing Time            Labor Length:    1st Stage (hrs/min) 3.00 5.00   2nd Stage (hrs/min) 0.00 3.00   3rd Stage (hrs/min)         Labor  "Events:     Labor No   Rupture Date     Rupture Time     Rupture Type Spontaneous rupture of membranes occuring during spontaneous labor or augmentation   Fluid Color     Labor Type     Induction    Induction Indication         Augmentation    Labor Complications     Additional Complications     Management of Labor        Antibiotics     IV Antibiotic Given     Additional Management     Fetal Status Prior to  Delivery     Fetal Status Comments         Cervical Ripening:    Date     Time     Type         Delivery:    Episiotomy None   Local Anesthetic        Lacerations None   Sponge Count Correct       Needle Count Correct     Final Count by:    Sutures     Blood loss (ml)    Packing Intentionally Left In     Number     Comments           Information for the patient's :  Cyn Fisher [[id number]]       Delivery  2018 11:08 PM by  Vaginal, Spontaneous Delivery  Sex:  male Gestational Age: 38w3d  Delivery Clinician:     Living?:            APGARS  One minute Five minutes Ten minutes   Skin color:            Heart rate:            Grimace:            Muscle tone:            Breathing:            Totals: 9  9         Presentation/position:           Resuscitation and Interventions: Method:  None  Oxygen Type:     Intubation Time:   # of Attempts:     ETT Size:        Tracheal Suction:     Tracheal returns:      Storden Care at Delivery:  Infant delivered to mom's abd, cry with stimulation        Cord information:     Disposition of cord blood:      Blood gases sent?    Complications:     Placenta: Delivered:           appearance.  Comments: Eber PUENTES, intact.  Disposition: Patient possesion   Measurements:  Weight: 6 lb 10.5 oz (3020 g)  Height: 19.5\"  Head circumference: 34.3 cm  Chest circumference:     Temperature:     Other providers:       Additional  information:  Forceps:    Verbal Informed Consent Obtained:       Alternative Labor Strategies Discussed:     Emergency Resources " Available:       Type:       Accrued Pulling Time:       # of Pulls:      Position:     Fetal Station:       Indications:      Other Indications:     Operative Vaginal Delivery Brief Note Forceps:        Vacuum:    Verbal Informed Consent Obtained:     Alternative Labor Strategies Discussed:     Emergency Resources Available:     Type:      Accrued Pulling Time:       # of Pop-Offs:       # of Pulls:       Position:     Fetal Station:      Indications for Vacuum:       Other Indications:    Operative Vaginal Delivery Brief Note Vacuum:        Shoulder Dystocia Shoulder Dystocia    Fetal Tracing Prior to Delivery:  Category 1   Shoulder dystocia present?:  No                                            Breech:       : Type:     Indications for Primary:     Indications for Secondary:     Other Indications:        Observed anomalies     Output in Delivery Room: Stool

## 2018-01-27 NOTE — ANESTHESIA PROCEDURE NOTES
Epidural Procedure Note    Staff:     Anesthesiologist:  CHET PALOMINO    Resident/CRNA:  RICK CACERES    Procedure performed by resident/CRNA in the presence of a teaching physician    Location: OB     Procedure start time:  1/26/2018 9:05 PM     Procedure end time:  1/26/2018 9:33 PM   Pre-procedure checklist:   patient identified, IV checked, site marked, risks and benefits discussed, informed consent, monitors and equipment checked, pre-op evaluation, at physician/surgeon's request and post-op pain management      Correct Patient: Yes      Correct Position: Yes      Correct Site: Yes      Correct Procedure: Yes      Correct Laterality:  Yes    Site Marked:  Yes  Procedure:     Procedure:  Epidural catheter    ASA:  2    Position:  Sitting    Sterile Prep: chloraprep, mask, sterile gloves and patient draped      Insertion site:  L3-4    Local skin infiltration:  1% lidocaine    amount (mL):  1    Approach:  Midline    Needle gauge (G):  17    Needle Length (in):  3.5    Block Needle Type:  Touhy    Injection Technique:  LORT saline and LORT air    RUTHIE at (cm):  7.5    Attempts:  3    Redirects:  1    Catheter gauge (G):  19    Catheter threaded easily: Yes      Threaded to cm at skin:  12    Threaded in epidural space (cm):  4.5    Paresthesias:  Yes and Resolved    Aspiration negative for Heme or CSF: Yes       Local anesthetic:  Lidocaine 1.5% w/ 1:200,000 epinephrine    Test dose time:  21:23    Test dose negative for signs of intravascular, subdural or intrathecal injection: Yes

## 2018-01-27 NOTE — PLAN OF CARE
Problem: Patient Care Overview  Goal: Plan of Care/Patient Progress Review  Outcome: No Change  Patient arrived to [facility] unit via wheelchair at 0140,with belongings, accompanied by Nurse, with infant in arms. Received report from OPHELIA Trujillo and checked bands. Unit and room orientation completd. Call light within arms reach; no concerns present at this time. Continue with plan of care.
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
## 2018-01-27 NOTE — PLAN OF CARE
Problem: Labor (Cervical Ripen, Induct, Augment) (Adult,Obstetrics,Pediatric)  Goal: Signs and Symptoms of Listed Potential Problems Will be Absent, Minimized or Managed (Labor)  Signs and symptoms of listed potential problems will be absent, minimized or managed by discharge/transition of care (reference Labor (Cervical Ripen, Induct, Augment) (Adult,Obstetrics,Pediatric) CPG).   Outcome: Improving  Data:  Contractions: Irregular, mild.  FHT's category one. VSS. Afebrile. Pt able to get some rest. Feeling contractions now in both lower back and lower abdomen. Hot packs being used. Pt tolerating labor well. Continues to leak clear fluid.  Interventions:  Pitocin induction/augmentation orders obtained.    Plan:  Start pitocin per orders.  Anticipate .  Notify provider of progressing labor, needs for pain medication, or maternal/fetal distress.

## 2018-01-27 NOTE — PROGRESS NOTES
Blood pressure 95/50, temperature 98.1  F (36.7  C), temperature source Oral, resp. rate 16, last menstrual period 2017, not currently breastfeeding.    General appearance: uncomfortable with contractions, requesting pain medication  CONTRACTIONS: moderate and every 3-6 minutes  Pitocin- 4 mu/min.,  Antibiotics- PCN  FETAL HEART TONES: continuous EFM- baseline 125 with moderate variability and positive accelerations. No decelerations.  ROM: clear fluid  PELVIC EXAM:deferred    ASSESSMENT:  ==============  IUP @ 38w3d with SROM without labor   Fetal Heart Rate Tracing category one  GBS- positive- antibiotics started    PLAN:  ===========  Pain medication options reviewed with pt. Pt may be interested in epidural at some point, discussed timing. Open to fentanyl at this time.  Reevaluate in 2-4 hours prn  Anticipate   Continue labor augmentation with Pitocin  Continue prophylactic IV antibiotic PCN for positive GBS status.  MD consultant on call Dr. Norton/ available prn     ISACC Franco CNM
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
## 2018-01-27 NOTE — ANESTHESIA PREPROCEDURE EVALUATION
Anesthesia Evaluation       history and physical reviewed . Pt has not had prior anesthetic            ROS/MED HX    ENT/Pulmonary:  - neg pulmonary ROS     Neurologic:  - neg neurologic ROS     Cardiovascular:  - neg cardiovascular ROS       METS/Exercise Tolerance:  >4 METS   Hematologic:  - neg hematologic  ROS       Musculoskeletal:  - neg musculoskeletal ROS       GI/Hepatic:  - neg GI/hepatic ROS       Renal/Genitourinary:  - ROS Renal section negative       Endo:  - neg endo ROS       Psychiatric:     (+) psychiatric history       Infectious Disease:  - neg infectious disease ROS       Malignancy:      - no malignancy   Other:    - neg other ROS                 Physical Exam  Normal systems: cardiovascular, pulmonary and dental    Airway   Mallampati: II  TM distance: >3 FB  Neck ROM: full    Dental     Cardiovascular       Pulmonary           neg OB ROS                 Anesthesia Plan      History & Physical Review  History and physical reviewed and following examination; no interval change.    ASA Status:  2 .  OB Epidural Asa: 2   NPO Status:  Full stomach    Plan for Epidural          Postoperative Care  Postoperative pain management:  Neuraxial analgesia.      Consents  Anesthetic plan, risks, benefits and alternatives discussed with:  Patient and Patient..        ANESTHESIA PREOP EVALUATION    Procedure: * No procedures listed *    HPI: Keyona Fisher is a 27 year old female presenting for labor.    PMHx/PSHx/ROS:  History reviewed. No pertinent past medical history.    History reviewed. No pertinent surgical history.      Past Anes Hx: No personal or family h/o anesthesia problems    Soc Hx:   Social History   Substance Use Topics     Smoking status: Current Every Day Smoker     Packs/day: 0.25     Smokeless tobacco: Never Used     Alcohol use No      Comment: occ       Allergies:   Allergies   Allergen Reactions     Ibuprofen        Meds:   Prescriptions Prior to Admission   Medication Sig  Dispense Refill Last Dose     Prenatal Vit-Fe Fumarate-FA (PRENATAL MULTIVITAMIN PLUS IRON) 27-0.8 MG TABS per tablet Take 1 tablet by mouth daily   1/25/2018 at Unknown time     acetaminophen 500 MG CAPS Take 2 tablets by mouth every 6 hours as needed   4/19/2017 at 1400     traMADol (ULTRAM) 50 MG tablet Take 1-2 tablets ( mg) by mouth every 6 hours as needed for pain 15 tablet 0 More than a month at Unknown time       No current outpatient prescriptions on file.       Physical Exam:  Vitals: BP 95/50  Temp 36.7  C (98.1  F) (Oral)  Resp 16  LMP 04/07/2017 (Approximate)  BMI= There is no height or weight on file to calculate BMI.      Labs:  UPT:   HCG Quantitative Serum   Date Value Ref Range Status   05/25/2010 599947 IU/L Final     Comment:     Non-Pregnant      0 - 5   Pregnant, weeks from LMP:    1 - 10 weeks     64 - 151,000 IU/L   11 - 15 weeks 11,800 - 152,000 IU/L   16 - 22 weeks  9,380 - 61,400 IU/L   23 - 40 weeks  1,740 - 98,600 IU/L         BMP:  Recent Labs   Lab Test  04/19/17   1645   NA  143   POTASSIUM  3.9   CHLORIDE  109   CO2  28   BUN  11   CR  0.64   GLC  86   CLAUS  8.8     CBC:   Recent Labs   Lab Test  01/26/18   1705   WBC  9.8   RBC  4.26   HGB  12.1   HCT  36.0   MCV  85   MCH  28.4   MCHC  33.6   RDW  14.2   PLT  203     Coags:  No results for input(s): INR, PTT, FIBR in the last 61046 hours.    Assessment/Plan:  - ASA 2  - Epidural  - PIV  - Antibiotics per surgery  - Relevant risks, benefits, alternatives and the anesthetic plan were discussed with patient/family or family representative.  All questions were answered and there was agreement to proceed.      Eddy SALVADOR3, D.O.    1/26/2018  8:48 PM

## 2018-01-28 VITALS
RESPIRATION RATE: 16 BRPM | OXYGEN SATURATION: 98 % | SYSTOLIC BLOOD PRESSURE: 106 MMHG | DIASTOLIC BLOOD PRESSURE: 70 MMHG | TEMPERATURE: 98.3 F | HEART RATE: 71 BPM

## 2018-01-28 PROCEDURE — 25000132 ZZH RX MED GY IP 250 OP 250 PS 637: Performed by: ADVANCED PRACTICE MIDWIFE

## 2018-01-28 RX ORDER — ACETAMINOPHEN 325 MG/1
650 TABLET ORAL EVERY 6 HOURS PRN
Qty: 100 TABLET | Refills: 1 | Status: SHIPPED | OUTPATIENT
Start: 2018-01-28 | End: 2019-09-24

## 2018-01-28 RX ORDER — PRENATAL VIT/IRON FUM/FOLIC AC 27MG-0.8MG
1 TABLET ORAL DAILY
Qty: 100 TABLET | Refills: 1 | Status: SHIPPED | OUTPATIENT
Start: 2018-01-28 | End: 2019-09-24

## 2018-01-28 RX ORDER — AMOXICILLIN 250 MG
2 CAPSULE ORAL 2 TIMES DAILY PRN
Qty: 100 TABLET | Refills: 1 | Status: SHIPPED | OUTPATIENT
Start: 2018-01-28 | End: 2019-09-24

## 2018-01-28 RX ADMIN — ACETAMINOPHEN 650 MG: 325 TABLET, FILM COATED ORAL at 03:10

## 2018-01-28 RX ADMIN — Medication 1 TABLET: at 09:01

## 2018-01-28 RX ADMIN — SENNOSIDES AND DOCUSATE SODIUM 2 TABLET: 8.6; 5 TABLET ORAL at 07:30

## 2018-01-28 RX ADMIN — ACETAMINOPHEN 650 MG: 325 TABLET, FILM COATED ORAL at 07:30

## 2018-01-28 RX ADMIN — BUPRENORPHINE HYDROCHLORIDE, NALOXONE HYDROCHLORIDE 3 FILM: 4; 1 FILM, SOLUBLE BUCCAL; SUBLINGUAL at 09:02
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
## 2018-01-28 NOTE — PLAN OF CARE
Problem: Patient Care Overview  Goal: Plan of Care/Patient Progress Review  Outcome: Improving  PP assessment WNL. Vital signs stable. Pt up ad gonzalez, steady gait. Intake and output appropriate. Pain managed with tylenol and hot packs for back discomfort-see MAR. Breastfeeding infant with minimal assist-encouraged deeper latch and hand massage/expression. Mother bonding appropriately with infant. No further concerns, will continue with pt plan of care.

## 2018-01-28 NOTE — PLAN OF CARE
Problem: Patient Care Overview  Goal: Plan of Care/Patient Progress Review  Outcome: Adequate for Discharge Date Met: 01/28/18  Patient is discharge today and discharge instructions were reviewed and questions were answered. She is boarding with baby in 7146 until baby will be discharged.

## 2018-01-29 ENCOUNTER — LACTATION ENCOUNTER (OUTPATIENT)
Age: 28
End: 2018-01-29

## 2018-01-29 NOTE — LACTATION NOTE
This note was copied from a baby's chart.  Asked by nurse to see this dyad for mom breastfeeding while on suboxone (Edmond L3). Infant utox positive for THC, negative all others, meconium screen still pending. Vaginal delivery >2days ago @ 38w3d, , AGA @ 6# 10.5 oz, 3.1% loss @ 24 hours, 5.3% loss at 48 hours. Bilirubin high intermediate risk still @ 36 hours of age. Has been both breast and bottle feeding thus far, nursing want to confirm ok to breastfeed taking suboxone.  Mom reports bilateral breast growth during pregnancy,  one of her children for >year without difficulty but only short time with first one (was teen in high school).  Breast exam WNL, nipples point downwards, everted, intact but pink and tender. Oral exam of infant WNL. Limited length of tongue visible beyond lingual frenum but did extend well beyond gumline, not seen beyond lower lip. Keeps tongue covering gumline with good extension while suck on finger for exam. Mom is tongue tied and says many in her family are as well.     Education provided about suboxone while breastfeeding, shared info from [employer]. Asked mom if any plan for current or future oxycodone use (per prenatal, used both in pregnancy) or THC use.  She denies plan to use either, reports she's in outpatient treatment program and has good support at home living with her inlaws and that her cousin recently came down to help & is supportive also. Expressed desire to work her program, stay sober. She became tearful when saying she feels sad son has to go through withdrawal even from the suboxone and nicotine, she wants to do all she can to comfort him. Discussed THC effects unknown fully but is definitely not recommended while breastfeeding.  Provide education about THC being fat soluble, higher concentrations in breastmilk (many times more concentrated than in her bloodstream if ingested), easily absorbed orally by baby and stored in fat cells that are more concentrated in  infant brain, etc. than that of adults.  Keyona reports that she understood & will not be smoking anything other than cigarettes.     Taught about ways to get and maintain deep latch, anatomy of breast and infant mouth. Helped her latch with hands on assist and had her re-latch on her own. Reports much more comfortable, denies pain with latch & able to see & fell difference. Nipples are tender but intact & likely from shallow latch earlier. When latched well he has good, nutritive suck and audible swallowing; at one point with breast compression he pulled away with cough, had suddenly gulped too much milk. No concern for tongue tie @ this visit. Reviewed breastfeeding resources and who to call for support. Plans follow up care @ [facility], she thinks there's LC support there but will call to be sure, aware of outside LC available prn if not.

## 2018-02-01 LAB — THC UR QL CFM: NORMAL

## 2019-09-23 ENCOUNTER — HOSPITAL ENCOUNTER (EMERGENCY)
Facility: CLINIC | Age: 29
Discharge: HOME | End: 2019-09-23
Attending: EMERGENCY MEDICINE | Admitting: EMERGENCY MEDICINE

## 2019-09-23 VITALS
SYSTOLIC BLOOD PRESSURE: 132 MMHG | WEIGHT: 209.5 LBS | BODY MASS INDEX: 37.11 KG/M2 | TEMPERATURE: 97.5 F | RESPIRATION RATE: 16 BRPM | OXYGEN SATURATION: 100 % | DIASTOLIC BLOOD PRESSURE: 72 MMHG

## 2019-09-23 DIAGNOSIS — F11.10 HEROIN ABUSE (H): ICD-10-CM

## 2019-09-23 LAB
AMPHETAMINES UR QL SCN: POSITIVE
BARBITURATES UR QL: NEGATIVE
BENZODIAZ UR QL: NEGATIVE
CANNABINOIDS UR QL SCN: POSITIVE
COCAINE UR QL: NEGATIVE
ETHANOL UR QL SCN: NEGATIVE
HCG UR QL: NEGATIVE
OPIATES UR QL SCN: POSITIVE

## 2019-09-23 PROCEDURE — 99283 EMERGENCY DEPT VISIT LOW MDM: CPT | Mod: Z6 | Performed by: EMERGENCY MEDICINE

## 2019-09-23 PROCEDURE — 99283 EMERGENCY DEPT VISIT LOW MDM: CPT | Performed by: EMERGENCY MEDICINE

## 2019-09-23 PROCEDURE — 80307 DRUG TEST PRSMV CHEM ANLYZR: CPT | Performed by: EMERGENCY MEDICINE

## 2019-09-23 PROCEDURE — 81025 URINE PREGNANCY TEST: CPT | Performed by: EMERGENCY MEDICINE

## 2019-09-23 PROCEDURE — 80320 DRUG SCREEN QUANTALCOHOLS: CPT | Performed by: EMERGENCY MEDICINE

## 2019-09-23 ASSESSMENT — ENCOUNTER SYMPTOMS
HEADACHES: 0
ARTHRALGIAS: 0
CONFUSION: 0
FEVER: 0
ABDOMINAL PAIN: 0
SHORTNESS OF BREATH: 0
DIFFICULTY URINATING: 0
DYSPHORIC MOOD: 1
COLOR CHANGE: 0
EYE REDNESS: 0
NECK STIFFNESS: 0

## 2019-09-23 NOTE — DISCHARGE INSTRUCTIONS
Call mental health intake in the morning to check on detox bed availability: [phone].    Return to the emergency department if fever, you feel unsafe, or other concerns.

## 2019-09-23 NOTE — ED AVS SNAPSHOT
[facility], [place], Emergency Department  [address]  [place] [address]  Phone:  [phone]  Fax:  [phone]                                    Keyona Fisher   MRN: [id number]    Department:  [facility], Emergency Department   Date of Visit:  9/23/2019           After Visit Summary Signature Page    I have received my discharge instructions, and my questions have been answered. I have discussed any challenges I see with this plan with the nurse or doctor.    ..........................................................................................................................................  Patient/Patient Representative Signature      ..........................................................................................................................................  Patient Representative Print Name and Relationship to Patient    ..................................................               ................................................  Date                                   Time    ..........................................................................................................................................  Reviewed by Signature/Title    ...................................................              ..............................................  Date                                               Time          22EPIC Rev 08/18

## 2019-09-23 NOTE — ED PROVIDER NOTES
History     Chief Complaint   Patient presents with     Addiction Problem     Patient looking for detox from heroin 1/2 g daily, smoking for last 5 months recently started IV use, marijuanna use occasionally, denies seizure history, last use yesterday      HPI  Keyona Fisher is a 29 year old female who presents to the emergency department seeking detox from heroin.  The patient states that she is been using heroin daily for the past 5 months.  Patient states that she uses one half a gram of heroin per day.  Patient states that she typically smokes heroin.  She did use IV heroin for the first time over the weekend.  Patient reports that she has not developed withdrawal symptoms including abdominal cramping, chills, nausea, and rhinorrhea.  Patient denies any other substance use.  She reports a history of depression but denies suicide ideation.  Patient denies any recent illness or medical concerns.    I have reviewed the Medications, Allergies, Past Medical and Surgical History, and Social History in the Epic system.    Review of Systems   Constitutional: Negative for fever.   HENT: Negative for congestion.    Eyes: Negative for redness.   Respiratory: Negative for shortness of breath.    Cardiovascular: Negative for chest pain.   Gastrointestinal: Negative for abdominal pain.   Genitourinary: Negative for difficulty urinating.   Musculoskeletal: Negative for arthralgias and neck stiffness.   Skin: Negative for color change.   Neurological: Negative for headaches.   Psychiatric/Behavioral: Positive for dysphoric mood. Negative for confusion and suicidal ideas.   All other systems reviewed and are negative.      Physical Exam   BP: 132/72  Heart Rate: 88  Temp: 97.5  F (36.4  C)  Resp: 16  Weight: 95 kg (209 lb 8 oz)  SpO2: 100 %      Physical Exam  Vitals signs and nursing note reviewed.   Constitutional:       General: She is not in acute distress.     Appearance: Normal appearance. She is not diaphoretic.    HENT:      Head: Normocephalic and atraumatic.      Mouth/Throat:      Pharynx: No oropharyngeal exudate.   Eyes:      General: No scleral icterus.     Pupils: Pupils are equal, round, and reactive to light.   Neck:      Musculoskeletal: Normal range of motion.   Cardiovascular:      Rate and Rhythm: Normal rate and regular rhythm.      Heart sounds: Normal heart sounds.   Pulmonary:      Effort: Pulmonary effort is normal. No respiratory distress.      Breath sounds: Normal breath sounds.   Abdominal:      General: Bowel sounds are normal.      Palpations: Abdomen is soft.      Tenderness: There is no tenderness.   Musculoskeletal:         General: No tenderness.      Comments: IV injection site right antecubital fossa without erythema, swelling, or drainage.   Skin:     General: Skin is warm.      Findings: No rash.   Neurological:      Mental Status: She is alert.      Coordination: Coordination is intact.      Gait: Gait is intact.   Psychiatric:         Mood and Affect: Mood normal.         Thought Content: Thought content does not include suicidal ideation.         ED Course        Procedures            Critical Care time:    4:31 PM Consulted with mental health intake-no detox beds available    Assessments & Plan (with Medical Decision Making)   29 year old female with 5-month history of daily heroin use.  She had been smoking heroin but did use intravenously for the first time over the past few days.  She does endorse withdrawal symptoms.  Unfortunately, there are no detox beds available today.  She does endorse some depression symptoms but does not have any suicidal ideation.  She does not have any medical concerns and has a normal physical examination.  The patient will be discharged home as there are no detox beds available today.  She was provided the phone number for mental health intake to call in the morning to check on detox bed availability.    I have reviewed the nursing notes.    I have reviewed  the findings, diagnosis, plan and need for follow up with the patient.    New Prescriptions    No medications on file       Final diagnoses:   Heroin abuse (H)       9/23/2019   [facility], [place], EMERGENCY DEPARTMENT     Cam Montes MD  09/23/19 [id number]

## 2019-09-24 ENCOUNTER — HOSPITAL ENCOUNTER (EMERGENCY)
Facility: CLINIC | Age: 29
Discharge: HOME | End: 2019-09-24
Attending: EMERGENCY MEDICINE | Admitting: EMERGENCY MEDICINE

## 2019-09-24 VITALS
OXYGEN SATURATION: 100 % | SYSTOLIC BLOOD PRESSURE: 115 MMHG | DIASTOLIC BLOOD PRESSURE: 78 MMHG | HEART RATE: 69 BPM | RESPIRATION RATE: 16 BRPM | TEMPERATURE: 98.1 F

## 2019-09-24 DIAGNOSIS — F11.10 HEROIN ABUSE (H): ICD-10-CM

## 2019-09-24 DIAGNOSIS — F11.10 OPIOID ABUSE (H): ICD-10-CM

## 2019-09-24 LAB
ALBUMIN SERPL-MCNC: 3.4 G/DL (ref 3.4–5)
ALP SERPL-CCNC: 75 U/L (ref 40–150)
ALT SERPL W P-5'-P-CCNC: 32 U/L (ref 0–50)
AMPHETAMINES UR QL SCN: POSITIVE
ANION GAP SERPL CALCULATED.3IONS-SCNC: 5 MMOL/L (ref 3–14)
AST SERPL W P-5'-P-CCNC: 11 U/L (ref 0–45)
BARBITURATES UR QL: NEGATIVE
BASOPHILS # BLD AUTO: 0 10E9/L (ref 0–0.2)
BASOPHILS NFR BLD AUTO: 0.3 %
BENZODIAZ UR QL: NEGATIVE
BILIRUB SERPL-MCNC: 0.3 MG/DL (ref 0.2–1.3)
BUN SERPL-MCNC: 8 MG/DL (ref 7–30)
CALCIUM SERPL-MCNC: 8.7 MG/DL (ref 8.5–10.1)
CANNABINOIDS UR QL SCN: POSITIVE
CHLORIDE SERPL-SCNC: 110 MMOL/L (ref 94–109)
CO2 SERPL-SCNC: 28 MMOL/L (ref 20–32)
COCAINE UR QL: NEGATIVE
CREAT SERPL-MCNC: 0.65 MG/DL (ref 0.52–1.04)
DIFFERENTIAL METHOD BLD: NORMAL
EOSINOPHIL # BLD AUTO: 0.1 10E9/L (ref 0–0.7)
EOSINOPHIL NFR BLD AUTO: 1 %
ERYTHROCYTE [DISTWIDTH] IN BLOOD BY AUTOMATED COUNT: 13.4 % (ref 10–15)
ETHANOL UR QL SCN: NEGATIVE
GFR SERPL CREATININE-BSD FRML MDRD: >90 ML/MIN/{1.73_M2}
GLUCOSE SERPL-MCNC: 100 MG/DL (ref 70–99)
HCG UR QL: NEGATIVE
HCT VFR BLD AUTO: 42.1 % (ref 35–47)
HGB BLD-MCNC: 14.1 G/DL (ref 11.7–15.7)
IMM GRANULOCYTES # BLD: 0 10E9/L (ref 0–0.4)
IMM GRANULOCYTES NFR BLD: 0 %
LYMPHOCYTES # BLD AUTO: 1.5 10E9/L (ref 0.8–5.3)
LYMPHOCYTES NFR BLD AUTO: 24.7 %
MCH RBC QN AUTO: 29.3 PG (ref 26.5–33)
MCHC RBC AUTO-ENTMCNC: 33.5 G/DL (ref 31.5–36.5)
MCV RBC AUTO: 88 FL (ref 78–100)
MONOCYTES # BLD AUTO: 0.4 10E9/L (ref 0–1.3)
MONOCYTES NFR BLD AUTO: 5.7 %
NEUTROPHILS # BLD AUTO: 4.2 10E9/L (ref 1.6–8.3)
NEUTROPHILS NFR BLD AUTO: 68.3 %
NRBC # BLD AUTO: 0 10*3/UL
NRBC BLD AUTO-RTO: 0 /100
OPIATES UR QL SCN: POSITIVE
PLATELET # BLD AUTO: 243 10E9/L (ref 150–450)
POTASSIUM SERPL-SCNC: 4.1 MMOL/L (ref 3.4–5.3)
PROT SERPL-MCNC: 7.2 G/DL (ref 6.8–8.8)
RBC # BLD AUTO: 4.81 10E12/L (ref 3.8–5.2)
SODIUM SERPL-SCNC: 143 MMOL/L (ref 133–144)
WBC # BLD AUTO: 6.1 10E9/L (ref 4–11)

## 2019-09-24 PROCEDURE — 25000128 H RX IP 250 OP 636: Performed by: EMERGENCY MEDICINE

## 2019-09-24 PROCEDURE — 96372 THER/PROPH/DIAG INJ SC/IM: CPT | Performed by: EMERGENCY MEDICINE

## 2019-09-24 PROCEDURE — 81025 URINE PREGNANCY TEST: CPT | Performed by: FAMILY MEDICINE

## 2019-09-24 PROCEDURE — 80053 COMPREHEN METABOLIC PANEL: CPT | Performed by: FAMILY MEDICINE

## 2019-09-24 PROCEDURE — 80320 DRUG SCREEN QUANTALCOHOLS: CPT | Performed by: FAMILY MEDICINE

## 2019-09-24 PROCEDURE — 36415 COLL VENOUS BLD VENIPUNCTURE: CPT | Performed by: FAMILY MEDICINE

## 2019-09-24 PROCEDURE — 25000132 ZZH RX MED GY IP 250 OP 250 PS 637: Performed by: EMERGENCY MEDICINE

## 2019-09-24 PROCEDURE — 99284 EMERGENCY DEPT VISIT MOD MDM: CPT | Mod: Z6 | Performed by: EMERGENCY MEDICINE

## 2019-09-24 PROCEDURE — 99284 EMERGENCY DEPT VISIT MOD MDM: CPT | Mod: 25 | Performed by: EMERGENCY MEDICINE

## 2019-09-24 PROCEDURE — 80307 DRUG TEST PRSMV CHEM ANLYZR: CPT | Performed by: FAMILY MEDICINE

## 2019-09-24 PROCEDURE — 85025 COMPLETE CBC W/AUTO DIFF WBC: CPT | Performed by: FAMILY MEDICINE

## 2019-09-24 PROCEDURE — 25000131 ZZH RX MED GY IP 250 OP 636 PS 637: Performed by: FAMILY MEDICINE

## 2019-09-24 PROCEDURE — 25000132 ZZH RX MED GY IP 250 OP 250 PS 637: Performed by: FAMILY MEDICINE

## 2019-09-24 RX ORDER — LORAZEPAM 0.5 MG/1
0.5 TABLET ORAL ONCE
Status: COMPLETED | OUTPATIENT
Start: 2019-09-24 | End: 2019-09-24

## 2019-09-24 RX ORDER — OLANZAPINE 10 MG/2ML
10 INJECTION, POWDER, FOR SOLUTION INTRAMUSCULAR ONCE
Status: COMPLETED | OUTPATIENT
Start: 2019-09-24 | End: 2019-09-24

## 2019-09-24 RX ORDER — ACETAMINOPHEN 325 MG/1
975 TABLET ORAL ONCE
Status: COMPLETED | OUTPATIENT
Start: 2019-09-24 | End: 2019-09-24

## 2019-09-24 RX ORDER — CLONIDINE HYDROCHLORIDE 0.1 MG/1
0.1 TABLET ORAL ONCE
Status: COMPLETED | OUTPATIENT
Start: 2019-09-24 | End: 2019-09-24

## 2019-09-24 RX ORDER — ONDANSETRON 4 MG/1
4 TABLET, ORALLY DISINTEGRATING ORAL
Status: COMPLETED | OUTPATIENT
Start: 2019-09-24 | End: 2019-09-24

## 2019-09-24 RX ADMIN — OLANZAPINE 10 MG: 10 INJECTION, POWDER, LYOPHILIZED, FOR SOLUTION INTRAMUSCULAR at 15:03

## 2019-09-24 RX ADMIN — CLONIDINE HYDROCHLORIDE 0.1 MG: 0.1 TABLET ORAL at 14:03

## 2019-09-24 RX ADMIN — ONDANSETRON 4 MG: 4 TABLET, ORALLY DISINTEGRATING ORAL at 12:48

## 2019-09-24 RX ADMIN — ACETAMINOPHEN 975 MG: 325 TABLET, FILM COATED ORAL at 12:48

## 2019-09-24 RX ADMIN — LORAZEPAM 0.5 MG: 0.5 TABLET ORAL at 14:03

## 2019-09-24 ASSESSMENT — ENCOUNTER SYMPTOMS
DIAPHORESIS: 1
CHILLS: 1
FEVER: 1
APPETITE CHANGE: 1
NAUSEA: 1
VOMITING: 1

## 2019-09-24 NOTE — ED PROVIDER NOTES
"    [facility] EMERGENCY DEPARTMENT ([facility])    9/24/19        History     Chief Complaint   Patient presents with     Addiction Problem     seeking detox from fentanyl, smokes ~1/2 gm/day. last use yesterday around 3pm. has bed held on 3A     The history is provided by the patient and medical records.     Keyona Fisher is a 29 year old female with a past medical history significant for polysubstance abuse and MDD who presents here to the Emergency Department seeking detox from heroin and fentanyl. Patient has called ahead and has a bed on hold. Patient states that she has been using 1/2 gram heroin daily for the past 2-3 weeks. Notes that she normally smokes heroin but had tried it IV for the first time this weekend. Notes she last used yesterday around 1-2 PM.  Patient has also noted using pain medications that had been dipped in fentanyl for which she was using orally. Patient states that prior to 2-3 weeks ago she was originally on \"pain pills\" and then was on suboxone. Patient denies taking anything today. Notes marijuana use with her last use a few days ago. Patient denies other drug or alcohol use. She believes she is currently going through withdrawals and is endorsing diaphoresis, nausea, vomiting and the sensation of feeling both hot and cold. Denies chance of pregnancy. Denies detox in the past.    I have reviewed the Medications, Allergies, Past Medical and Surgical History, and Social History in the Epic system.    History reviewed. No pertinent past medical history.    History reviewed. No pertinent surgical history.    History reviewed. No pertinent family history.    Social History     Tobacco Use     Smoking status: Current Every Day Smoker     Packs/day: 0.25     Smokeless tobacco: Never Used   Substance Use Topics     Alcohol use: Yes     Comment: occasionally        No current facility-administered medications for this encounter.      Current Outpatient Medications   Medication     " acetaminophen (TYLENOL) 325 MG tablet     ferrous fumarate-vitamin C (GRANT-SEQUELS) 65-25 MG CR tablet     Prenatal Vit-Fe Fumarate-FA (PRENATAL MULTIVITAMIN PLUS IRON) 27-0.8 MG TABS per tablet     senna-docusate (SENOKOT-S;PERICOLACE) 8.6-50 MG per tablet        Allergies   Allergen Reactions     Ibuprofen          Review of Systems   Constitutional: Positive for appetite change, chills, diaphoresis and fever (Subjective).   Gastrointestinal: Positive for nausea and vomiting.   All other systems reviewed and are negative.      Physical Exam   BP: 115/78  Pulse: 69  Temp: 98.1  F (36.7  C)  Resp: 16  SpO2: 100 %      Physical Exam  Constitutional:       Appearance: She is well-developed. She is diaphoretic. She is not ill-appearing.   HENT:      Head: Normocephalic and atraumatic.   Neck:      Musculoskeletal: Normal range of motion.   Cardiovascular:      Rate and Rhythm: Normal rate and regular rhythm.      Heart sounds: Normal heart sounds.   Pulmonary:      Effort: Pulmonary effort is normal. No respiratory distress.      Breath sounds: Normal breath sounds.   Abdominal:      General: There is no distension.      Palpations: Abdomen is soft.      Tenderness: There is no tenderness. There is no rebound.   Musculoskeletal:         General: No tenderness.   Skin:     General: Skin is warm.   Neurological:      Mental Status: She is alert and oriented to person, place, and time.   Psychiatric:         Behavior: Behavior normal.         Thought Content: Thought content normal.      Comments: anxious         ED Course   12:36 PM  The patient was seen and examined by Vicky Cruz MD in Room ED20.        Procedures             Critical Care time:  none         Results for orders placed or performed during the hospital encounter of 09/24/19   CBC with platelets differential   Result Value Ref Range    WBC 6.1 4.0 - 11.0 10e9/L    RBC Count 4.81 3.8 - 5.2 10e12/L    Hemoglobin 14.1 11.7 - 15.7 g/dL    Hematocrit 42.1  35.0 - 47.0 %    MCV 88 78 - 100 fl    MCH 29.3 26.5 - 33.0 pg    MCHC 33.5 31.5 - 36.5 g/dL    RDW 13.4 10.0 - 15.0 %    Platelet Count 243 150 - 450 10e9/L    Diff Method Automated Method     % Neutrophils 68.3 %    % Lymphocytes 24.7 %    % Monocytes 5.7 %    % Eosinophils 1.0 %    % Basophils 0.3 %    % Immature Granulocytes 0.0 %    Nucleated RBCs 0 0 /100    Absolute Neutrophil 4.2 1.6 - 8.3 10e9/L    Absolute Lymphocytes 1.5 0.8 - 5.3 10e9/L    Absolute Monocytes 0.4 0.0 - 1.3 10e9/L    Absolute Eosinophils 0.1 0.0 - 0.7 10e9/L    Absolute Basophils 0.0 0.0 - 0.2 10e9/L    Abs Immature Granulocytes 0.0 0 - 0.4 10e9/L    Absolute Nucleated RBC 0.0    Comprehensive metabolic panel   Result Value Ref Range    Sodium 143 133 - 144 mmol/L    Potassium 4.1 3.4 - 5.3 mmol/L    Chloride 110 (H) 94 - 109 mmol/L    Carbon Dioxide 28 20 - 32 mmol/L    Anion Gap 5 3 - 14 mmol/L    Glucose 100 (H) 70 - 99 mg/dL    Urea Nitrogen 8 7 - 30 mg/dL    Creatinine 0.65 0.52 - 1.04 mg/dL    GFR Estimate >90 >60 mL/min/[1.73_m2]    GFR Estimate If Black >90 >60 mL/min/[1.73_m2]    Calcium 8.7 8.5 - 10.1 mg/dL    Bilirubin Total 0.3 0.2 - 1.3 mg/dL    Albumin 3.4 3.4 - 5.0 g/dL    Protein Total 7.2 6.8 - 8.8 g/dL    Alkaline Phosphatase 75 40 - 150 U/L    ALT 32 0 - 50 U/L    AST 11 0 - 45 U/L     Medications   ondansetron (ZOFRAN-ODT) ODT tab 4 mg (4 mg Oral Given 9/24/19 1248)   acetaminophen (TYLENOL) tablet 975 mg (975 mg Oral Given 9/24/19 1248)   cloNIDine (CATAPRES) tablet 0.1 mg (0.1 mg Oral Given 9/24/19 1403)   LORazepam (ATIVAN) tablet 0.5 mg (0.5 mg Oral Given 9/24/19 1403)   OLANZapine (zyPREXA) injection 10 mg (10 mg Intramuscular Given 9/24/19 1503)            Labs Ordered and Resulted from Time of ED Arrival Up to the Time of Departure from the ED - No data to display         Assessments & Plan (with Medical Decision Making)   Is a 29-year-old female with a history of IV heroin and fentanyl abuse who presents to the  ER seeking detox for opioid abuse.  Patient was having withdrawals which presented to the ER and so was given some Tylenol and some Zofran.  Patient continued to feel shaky so was given some clonidine and some oral Ativan.  Patient felt like she was still having active withdrawal symptoms so was given 1 IM dose of Zyprexa this evening get her to sleep.  Patient however felt like she was not getting better and wanted to leave.  I had multiple discussions with the patient as did the nurse to see if and get her to come down but patient said that she wanted to leave.  Patient was discharged from the ER since she no longer wanted to wait for detox bed to become available.  Patient given the number to call if she decides to change your mind and wants to come back for detox admission.    I have reviewed the nursing notes.    I have reviewed the findings, diagnosis, plan and need for follow up with the patient.    New Prescriptions    No medications on file       Final diagnoses:   Opioid abuse (H)   Heroin abuse (H)     Peter MONTANA, am serving as a trained medical scribe to document services personally performed by Vicky Cruz MD, based on the provider's statements to me.   Vicky MONTANA MD, was physically present and have reviewed and verified the accuracy of this note documented by Peter Yeung.    9/24/2019   [facility], [place], EMERGENCY DEPARTMENT     Vicky Cruz MD  09/24/19 [id number]

## 2019-09-24 NOTE — ED AVS SNAPSHOT
[facility], [place], Emergency Department  [address]  [facility] [place] [address]  Phone:  [phone]  Fax:  [phone]                                    Keyona Fisher   MRN: [id number]    Department:  [facility], Emergency Department   Date of Visit:  9/24/2019           After Visit Summary Signature Page    I have received my discharge instructions, and my questions have been answered. I have discussed any challenges I see with this plan with the nurse or doctor.    ..........................................................................................................................................  Patient/Patient Representative Signature      ..........................................................................................................................................  Patient Representative Print Name and Relationship to Patient    ..................................................               ................................................  Date                                   Time    ..........................................................................................................................................  Reviewed by Signature/Title    ...................................................              ..............................................  Date                                               Time          22EPIC Rev 08/18

## 2019-09-24 NOTE — DISCHARGE INSTRUCTIONS
Please call [phone] to ask about detox bed availability.   Call every morning until a bed becomes available.       Return to the ER if any other problems/concerns.

## 2019-09-24 NOTE — PHARMACY-ADMISSION MEDICATION HISTORY
Admission Medication History status for the 9/24/2019 admission is complete.  See EPIC admission navigator for Prior to Admission medications.    Medication history sources:  Patient, Chart Review     Medication history source reliability: Moderate    Medication adherence:  Moderate    Changes made to PTA medication list (reason)  Added: None  Deleted: Acetaminophen, Ferrous fumarate-vitamin c, prenatal MV, senna-docusate  Changed: None    Additional medication history information (including reliability of information, actions taken by pharmacist):   -Pt reports not taking any medications.     Time spent in this activity: 15 minutes    Medication history completed by: Martha Curry RPH     Prior to Admission medications    Not on File

## 2020-04-17 ENCOUNTER — APPOINTMENT (OUTPATIENT)
Dept: CT IMAGING | Facility: CLINIC | Age: 30
DRG: 392 | End: 2020-04-17
Attending: EMERGENCY MEDICINE

## 2020-04-17 ENCOUNTER — HOSPITAL ENCOUNTER (INPATIENT)
Facility: CLINIC | Age: 30
LOS: 1 days | Discharge: NURSING, REHAB OR LONG-TERM CARE | DRG: 392 | End: 2020-04-19
Attending: EMERGENCY MEDICINE | Admitting: INTERNAL MEDICINE

## 2020-04-17 DIAGNOSIS — F11.10 HEROIN ABUSE (H): ICD-10-CM

## 2020-04-17 DIAGNOSIS — K56.0 PARALYTIC ILEUS (H): ICD-10-CM

## 2020-04-17 DIAGNOSIS — K82.8 THICKENING OF WALL OF GALLBLADDER: ICD-10-CM

## 2020-04-17 DIAGNOSIS — N39.0 URINARY TRACT INFECTION WITHOUT HEMATURIA, SITE UNSPECIFIED: Primary | ICD-10-CM

## 2020-04-17 DIAGNOSIS — R10.84 GENERALIZED ABDOMINAL PAIN: ICD-10-CM

## 2020-04-17 LAB
ALBUMIN SERPL-MCNC: 3.4 G/DL (ref 3.4–5)
ALP SERPL-CCNC: 87 U/L (ref 40–150)
ALT SERPL W P-5'-P-CCNC: 107 U/L (ref 0–50)
ANION GAP SERPL CALCULATED.3IONS-SCNC: 8 MMOL/L (ref 3–14)
AST SERPL W P-5'-P-CCNC: 31 U/L (ref 0–45)
BASOPHILS # BLD AUTO: 0 10E9/L (ref 0–0.2)
BASOPHILS NFR BLD AUTO: 0 %
BILIRUB SERPL-MCNC: 1 MG/DL (ref 0.2–1.3)
BUN SERPL-MCNC: 7 MG/DL (ref 7–30)
CALCIUM SERPL-MCNC: 9 MG/DL (ref 8.5–10.1)
CHLORIDE SERPL-SCNC: 99 MMOL/L (ref 94–109)
CO2 SERPL-SCNC: 26 MMOL/L (ref 20–32)
CREAT SERPL-MCNC: 0.5 MG/DL (ref 0.52–1.04)
DIFFERENTIAL METHOD BLD: ABNORMAL
EOSINOPHIL # BLD AUTO: 0 10E9/L (ref 0–0.7)
EOSINOPHIL NFR BLD AUTO: 0 %
ERYTHROCYTE [DISTWIDTH] IN BLOOD BY AUTOMATED COUNT: 13.5 % (ref 10–15)
GFR SERPL CREATININE-BSD FRML MDRD: >90 ML/MIN/{1.73_M2}
GLUCOSE SERPL-MCNC: 105 MG/DL (ref 70–99)
HCG SERPL QL: NEGATIVE
HCT VFR BLD AUTO: 40.2 % (ref 35–47)
HGB BLD-MCNC: 13.8 G/DL (ref 11.7–15.7)
IMM GRANULOCYTES # BLD: 0.1 10E9/L (ref 0–0.4)
IMM GRANULOCYTES NFR BLD: 0.4 %
INR PPP: 1.06 (ref 0.86–1.14)
LACTATE BLD-SCNC: 0.9 MMOL/L (ref 0.7–2)
LIPASE SERPL-CCNC: 31 U/L (ref 73–393)
LYMPHOCYTES # BLD AUTO: 0.6 10E9/L (ref 0.8–5.3)
LYMPHOCYTES NFR BLD AUTO: 4.2 %
MCH RBC QN AUTO: 29.9 PG (ref 26.5–33)
MCHC RBC AUTO-ENTMCNC: 34.3 G/DL (ref 31.5–36.5)
MCV RBC AUTO: 87 FL (ref 78–100)
MONOCYTES # BLD AUTO: 0.3 10E9/L (ref 0–1.3)
MONOCYTES NFR BLD AUTO: 2.5 %
NEUTROPHILS # BLD AUTO: 12.6 10E9/L (ref 1.6–8.3)
NEUTROPHILS NFR BLD AUTO: 92.9 %
NRBC # BLD AUTO: 0 10*3/UL
NRBC BLD AUTO-RTO: 0 /100
PLATELET # BLD AUTO: 222 10E9/L (ref 150–450)
POTASSIUM SERPL-SCNC: 3.5 MMOL/L (ref 3.4–5.3)
PROT SERPL-MCNC: 7.6 G/DL (ref 6.8–8.8)
RADIOLOGIST FLAGS: NORMAL
RBC # BLD AUTO: 4.62 10E12/L (ref 3.8–5.2)
SODIUM SERPL-SCNC: 133 MMOL/L (ref 133–144)
WBC # BLD AUTO: 13.6 10E9/L (ref 4–11)

## 2020-04-17 PROCEDURE — 25000125 ZZHC RX 250: Performed by: EMERGENCY MEDICINE

## 2020-04-17 PROCEDURE — 80053 COMPREHEN METABOLIC PANEL: CPT | Performed by: EMERGENCY MEDICINE

## 2020-04-17 PROCEDURE — G0378 HOSPITAL OBSERVATION PER HR: HCPCS

## 2020-04-17 PROCEDURE — 83605 ASSAY OF LACTIC ACID: CPT | Performed by: EMERGENCY MEDICINE

## 2020-04-17 PROCEDURE — 25000128 H RX IP 250 OP 636: Performed by: EMERGENCY MEDICINE

## 2020-04-17 PROCEDURE — 85025 COMPLETE CBC W/AUTO DIFF WBC: CPT | Performed by: EMERGENCY MEDICINE

## 2020-04-17 PROCEDURE — 83690 ASSAY OF LIPASE: CPT | Performed by: EMERGENCY MEDICINE

## 2020-04-17 PROCEDURE — 87635 SARS-COV-2 COVID-19 AMP PRB: CPT | Performed by: EMERGENCY MEDICINE

## 2020-04-17 PROCEDURE — 84703 CHORIONIC GONADOTROPIN ASSAY: CPT | Performed by: EMERGENCY MEDICINE

## 2020-04-17 PROCEDURE — 25800030 ZZH RX IP 258 OP 636: Performed by: EMERGENCY MEDICINE

## 2020-04-17 PROCEDURE — 99219 ZZC INITIAL OBSERVATION CARE,LEVL II: CPT | Performed by: HOSPITALIST

## 2020-04-17 PROCEDURE — 85610 PROTHROMBIN TIME: CPT | Performed by: EMERGENCY MEDICINE

## 2020-04-17 PROCEDURE — 96361 HYDRATE IV INFUSION ADD-ON: CPT

## 2020-04-17 PROCEDURE — 96360 HYDRATION IV INFUSION INIT: CPT

## 2020-04-17 PROCEDURE — 74177 CT ABD & PELVIS W/CONTRAST: CPT

## 2020-04-17 PROCEDURE — 99285 EMERGENCY DEPT VISIT HI MDM: CPT | Mod: 25

## 2020-04-17 RX ORDER — SODIUM CHLORIDE 9 MG/ML
1000 INJECTION, SOLUTION INTRAVENOUS CONTINUOUS
Status: DISCONTINUED | OUTPATIENT
Start: 2020-04-17 | End: 2020-04-18

## 2020-04-17 RX ORDER — IOPAMIDOL 755 MG/ML
104 INJECTION, SOLUTION INTRAVASCULAR ONCE
Status: COMPLETED | OUTPATIENT
Start: 2020-04-17 | End: 2020-04-17

## 2020-04-17 RX ADMIN — SODIUM CHLORIDE 70 ML: 9 INJECTION, SOLUTION INTRAVENOUS at 21:36

## 2020-04-17 RX ADMIN — SODIUM CHLORIDE 1000 ML: 9 INJECTION, SOLUTION INTRAVENOUS at 19:56

## 2020-04-17 RX ADMIN — IOPAMIDOL 104 ML: 755 INJECTION, SOLUTION INTRAVENOUS at 21:35

## 2020-04-17 ASSESSMENT — ENCOUNTER SYMPTOMS
FEVER: 1
NAUSEA: 0
VOMITING: 0
ABDOMINAL PAIN: 1
COUGH: 1
DIARRHEA: 0

## 2020-04-17 ASSESSMENT — ACTIVITIES OF DAILY LIVING (ADL)
RETIRED_EATING: 0-->INDEPENDENT
BATHING: 0-->INDEPENDENT
RETIRED_COMMUNICATION: 0-->UNDERSTANDS/COMMUNICATES WITHOUT DIFFICULTY
DRESS: 0-->INDEPENDENT
SWALLOWING: 0-->SWALLOWS FOODS/LIQUIDS WITHOUT DIFFICULTY
TOILETING: 0-->INDEPENDENT

## 2020-04-17 ASSESSMENT — MIFFLIN-ST. JEOR: SCORE: 1642.15

## 2020-04-17 NOTE — ED PROVIDER NOTES
"  History     Chief Complaint:  Abdominal Pain    The history is provided by the patient. History limited by: poor historian.      Keyona Fisher is a 29 year old female who presents via EMS for evaluation of abdominal pain. The patient reports constant abdominal pain since yesterday morning. No history of similar pain. She denies nausea, vomiting, diarrhea, bowel abnormalities, or other complaints. The patient did cough during her exam, and then states she has had a slight cough. Here, the patient is febrile with a temperature of 100.3. No history of abdominal surgeries. She does endorse injecting heroin most recently as today. Otherwise of note, the patient tested negative for COVID on 3/26/2020.     Allergies:  Ibuprofen     Medications:    The patient is not currently taking any prescribed medications.     Past Medical History:    Major depressive disorder  STD  Tobacco dependence    Past Surgical History:    History reviewed. No pertinent past surgical history.     Family History:    History reviewed. No pertinent family history.       Social History:  The patient was [facility] the ED by EMS.  [facility]  Smoking Status: Yes - current every day smoker  Smokeless Tobacco: Never  Alcohol Use: Yes  Drug Use: Yes - marijuana, opiates   Marital Status:  Single [1]     Review of Systems   Constitutional: Positive for fever.   Respiratory: Positive for cough.    Gastrointestinal: Positive for abdominal pain. Negative for diarrhea, nausea and vomiting.   All other systems reviewed and are negative.      Physical Exam     Patient Vitals for the past 24 hrs:   BP Temp Temp src Pulse Heart Rate Resp SpO2 Height Weight   04/17/20 2000 124/77 -- -- 107 -- -- 96 % -- --   04/17/20 1945 -- -- -- -- -- -- 96 % -- --   04/17/20 1900 -- -- -- -- -- -- 100 % -- --   04/17/20 1845 132/79 -- -- 105 -- -- 98 % -- --   04/17/20 1843 132/79 100.3  F (37.9  C) Oral -- 105 18 97 % 1.6 m (5' 3\") 94.8 kg (209 lb)       Physical Exam "   Nursing note and vitals reviewed.  Constitutional:  Oriented to person, place, and time. Cooperative.  Intermittently somnolent though although easily arousable.  HENT:   Nose:    Nose normal.   Mouth/Throat:   Mucous membranes are normal.   Eyes:    Conjunctivae normal and EOM are normal.      Pupils are equal, round, and reactive to light.   Neck:    Trachea normal.   Cardiovascular:  Normal rate, regular rhythm, normal heart sounds and normal pulses. No murmur heard.  Pulmonary/Chest:  Effort normal and breath sounds normal.   Abdominal:   Soft. Normal appearance and bowel sounds are normal.      Diffuse tenderness to palpation.      There is no rebound and no CVA tenderness.   Musculoskeletal:  Extremities atraumatic x 4.   Lymphadenopathy:  No cervical adenopathy.   Neurological:   Alert and oriented to person, place, and time. Normal strength.      No cranial nerve deficit or sensory deficit. GCS eye subscore is 4. GCS verbal subscore is 5. GCS motor subscore is 6.   Skin:    Skin is intact. No rash noted.   Psychiatric:   Normal mood and affect.         Emergency Department Course     Imaging:  Radiology findings were communicated with the patient who voiced understanding of the findings.    CT Abdomen Pelvis w Contrast  IMPRESSION:   1.  Minimal to moderate small bowel distention with fluid throughout  its course tapering distally down into the pelvis without evidence for  discrete transition point or suspicious discrete obstructive process.  Associated suggestion of mild small bowel wall thickening in the  central pelvis with mesenteric edema involving the central pelvis.  Findings are suspicious for an underlying enteritis with associated  ileus. Early mechanical small bowel obstruction cannot be excluded.  Continued close CT follow-up recommended. If there is further increase  in symptomatology repeat evaluation with IV and oral contrast would be  of benefit.     2.  No evidence of for perforation or  abscess formation. No evidence  for colonic wall thickening or inflammatory change. Appendix is  unremarkable.     3.  Focal area of gallbladder wall thickening with associated punctate  areas of calcification involving the gallbladder fundus without  evidence for acute inflammatory change. These findings can be  associated with adenomyosis or early neoplastic processes. Recommend a  follow-up nonemergent ultrasound if the patient does not have acute  right upper quadrant pain.     [Recommend Follow Up: Focal gallbladder wall thickening and  calcification.]     This report will be copied to the [facility] to ensure a  provider acknowledges the finding.  CURT L BEHRNS, MD   Reading per radiology.    Laboratory:  Laboratory findings were communicated with the patient who voiced understanding of the findings.    CBC: WBC: 13.6 (H), HGB 13.8, PLT: 222    CMP: Glucose 105 (H), Creatinine 0.50 (L),  (H), o/w WNL    Lactic Acid (Collected 1954): 0.9    HGC Qualitative: Negative    INR: 1.06    Lipase: 31 (L)    UA: pending    COVID-19 Virus (Coronavirus) by PCR Nasopharyngeal swab: pending     Interventions:  1956 NS 1000 mL IV Bolus    Emergency Department Course:  Past medical records, nursing notes, and vitals reviewed.    1932 I performed an exam of the patient as documented above.     IV was inserted and blood was drawn for laboratory testing, results above.    The patient provided a urine sample here in the emergency department. This was sent for laboratory testing, findings above.     The patient was sent for a CT Abdomen Pelvis while in the emergency department, results above.     Medication and IV fluids administered.    2210 I rechecked the patient and discussed the results of her workup thus far.     2227 I spoke with Dr. Yeung, hospitalist, who agreed to admit the patient.     2233 I spoke with Dr. Yeung, hospitalist, regarding the patient. The patient will be tested for COVID-19 prior to  admission.    Findings and plan explained to the Patient who consents to admission. Discussed the patient with Dr. Yeung, who will admit the patient to a Observation bed for further monitoring, evaluation, and treatment.     I personally reviewed the laboratory and imaging results with the Patient and answered all related questions prior to discharge.     Impression & Plan       Medical Decision Making:  This is a 29-year-old female came in for further evaluation of diffuse abdominal pain.  Unfortunately, she has someone that uses heroin regularly, and she was at times somnolent.  However she woke up quite easily.  I was concerned that she could have any number of causes of her pain including colitis, diverticulitis, bowel obstruction, stool impaction secondary to opiates, bowel perforation, appendicitis, peptic ulcer disease, or possibly gallbladder disease or pancreatitis.  I proceeded with the above blood work and CT scan.  Her CT scan shows the above findings, which I think are most likely with an ileus secondary to her heroin use.  She also will likely need an ultrasound of her gallbladder, which I informed her of, although I do not feel it needs to be done emergently.  She is apparently homeless, and I do not feel comfortable discharging her at this point.  I think it is best that she be brought into the hospital for further evaluation and management.  I subsequently spoke with Dr. Yeung, who will be taking care of the patient.  We also are going to recheck her for COVID-19, given her ongoing fever and slight cough as well as the fact that she is homeless and rides the trains regularly.    Diagnosis:    ICD-10-CM    1. Generalized abdominal pain  R10.84    2. Paralytic ileus (H)  K56.0    3. Heroin abuse (H)  F11.10    4. Focal thickening of wall of gallbladder  K82.8        Disposition:  Admitted to Observation.      I, Ana Rosa Judd, am serving as a scribe on 4/17/2020 at 9:41 PM to personally document  services performed by Richie Leon MD based on my observations and the provider's statements to me.      Ana Rosa Judd   4/17/2020    EMERGENCY DEPARTMENT       Richie Leon MD  04/17/20 [id number]

## 2020-04-17 NOTE — ED TRIAGE NOTES
Pt riding lightrail today, c/o abd pain. Normal bm yesterday, took heroin this morning. COVID test on march 26th which was neg.

## 2020-04-18 ENCOUNTER — APPOINTMENT (OUTPATIENT)
Dept: ULTRASOUND IMAGING | Facility: CLINIC | Age: 30
DRG: 392 | End: 2020-04-18
Attending: HOSPITALIST

## 2020-04-18 PROBLEM — K56.7 ILEUS (H): Status: ACTIVE | Noted: 2020-04-18

## 2020-04-18 PROBLEM — R10.9 ABDOMINAL PAIN: Status: ACTIVE | Noted: 2020-04-18

## 2020-04-18 LAB
ALBUMIN SERPL-MCNC: 3 G/DL (ref 3.4–5)
ALBUMIN UR-MCNC: 30 MG/DL
ALP SERPL-CCNC: 75 U/L (ref 40–150)
ALT SERPL W P-5'-P-CCNC: 88 U/L (ref 0–50)
AMPHETAMINES UR QL SCN: POSITIVE
ANION GAP SERPL CALCULATED.3IONS-SCNC: 7 MMOL/L (ref 3–14)
APPEARANCE UR: ABNORMAL
AST SERPL W P-5'-P-CCNC: 24 U/L (ref 0–45)
BARBITURATES UR QL: NEGATIVE
BENZODIAZ UR QL: NEGATIVE
BILIRUB DIRECT SERPL-MCNC: 0.4 MG/DL (ref 0–0.2)
BILIRUB SERPL-MCNC: 0.8 MG/DL (ref 0.2–1.3)
BILIRUB UR QL STRIP: NEGATIVE
BUN SERPL-MCNC: 7 MG/DL (ref 7–30)
CALCIUM SERPL-MCNC: 9 MG/DL (ref 8.5–10.1)
CANNABINOIDS UR QL SCN: POSITIVE
CHLORIDE SERPL-SCNC: 100 MMOL/L (ref 94–109)
CO2 SERPL-SCNC: 28 MMOL/L (ref 20–32)
COCAINE UR QL: NEGATIVE
COLOR UR AUTO: YELLOW
CREAT SERPL-MCNC: 0.56 MG/DL (ref 0.52–1.04)
ERYTHROCYTE [DISTWIDTH] IN BLOOD BY AUTOMATED COUNT: 13.5 % (ref 10–15)
GFR SERPL CREATININE-BSD FRML MDRD: >90 ML/MIN/{1.73_M2}
GLUCOSE SERPL-MCNC: 118 MG/DL (ref 70–99)
GLUCOSE UR STRIP-MCNC: NEGATIVE MG/DL
HCT VFR BLD AUTO: 38.1 % (ref 35–47)
HGB BLD-MCNC: 13.1 G/DL (ref 11.7–15.7)
HGB UR QL STRIP: NEGATIVE
KETONES UR STRIP-MCNC: 40 MG/DL
LACTATE BLD-SCNC: 0.9 MMOL/L (ref 0.7–2)
LEUKOCYTE ESTERASE UR QL STRIP: ABNORMAL
MCH RBC QN AUTO: 29.8 PG (ref 26.5–33)
MCHC RBC AUTO-ENTMCNC: 34.4 G/DL (ref 31.5–36.5)
MCV RBC AUTO: 87 FL (ref 78–100)
MUCOUS THREADS #/AREA URNS LPF: PRESENT /LPF
NITRATE UR QL: NEGATIVE
OPIATES UR QL SCN: POSITIVE
PCP UR QL SCN: NEGATIVE
PH UR STRIP: 7.5 PH (ref 5–7)
PLATELET # BLD AUTO: 199 10E9/L (ref 150–450)
POTASSIUM SERPL-SCNC: 3.7 MMOL/L (ref 3.4–5.3)
PROT SERPL-MCNC: 7.3 G/DL (ref 6.8–8.8)
RBC # BLD AUTO: 4.39 10E12/L (ref 3.8–5.2)
RBC #/AREA URNS AUTO: 7 /HPF (ref 0–2)
SARS-COV-2 PCR COMMENT: NORMAL
SARS-COV-2 RNA SPEC QL NAA+PROBE: NEGATIVE
SARS-COV-2 RNA SPEC QL NAA+PROBE: NORMAL
SODIUM SERPL-SCNC: 135 MMOL/L (ref 133–144)
SOURCE: ABNORMAL
SP GR UR STRIP: 1.03 (ref 1–1.03)
SPECIMEN SOURCE: NORMAL
SPECIMEN SOURCE: NORMAL
SQUAMOUS #/AREA URNS AUTO: 1 /HPF (ref 0–1)
UROBILINOGEN UR STRIP-MCNC: 2 MG/DL (ref 0–2)
WBC # BLD AUTO: 22.7 10E9/L (ref 4–11)
WBC #/AREA URNS AUTO: 14 /HPF (ref 0–5)

## 2020-04-18 PROCEDURE — 25000132 ZZH RX MED GY IP 250 OP 250 PS 637: Performed by: INTERNAL MEDICINE

## 2020-04-18 PROCEDURE — 80307 DRUG TEST PRSMV CHEM ANLYZR: CPT | Performed by: INTERNAL MEDICINE

## 2020-04-18 PROCEDURE — G0378 HOSPITAL OBSERVATION PER HR: HCPCS

## 2020-04-18 PROCEDURE — 80076 HEPATIC FUNCTION PANEL: CPT | Performed by: HOSPITALIST

## 2020-04-18 PROCEDURE — 25000132 ZZH RX MED GY IP 250 OP 250 PS 637: Performed by: HOSPITALIST

## 2020-04-18 PROCEDURE — 36415 COLL VENOUS BLD VENIPUNCTURE: CPT | Performed by: HOSPITALIST

## 2020-04-18 PROCEDURE — 25000128 H RX IP 250 OP 636: Performed by: HOSPITALIST

## 2020-04-18 PROCEDURE — 87040 BLOOD CULTURE FOR BACTERIA: CPT | Performed by: HOSPITALIST

## 2020-04-18 PROCEDURE — 76705 ECHO EXAM OF ABDOMEN: CPT

## 2020-04-18 PROCEDURE — 25000128 H RX IP 250 OP 636: Performed by: INTERNAL MEDICINE

## 2020-04-18 PROCEDURE — 80048 BASIC METABOLIC PNL TOTAL CA: CPT | Performed by: HOSPITALIST

## 2020-04-18 PROCEDURE — 81001 URINALYSIS AUTO W/SCOPE: CPT | Performed by: EMERGENCY MEDICINE

## 2020-04-18 PROCEDURE — 85027 COMPLETE CBC AUTOMATED: CPT | Performed by: HOSPITALIST

## 2020-04-18 PROCEDURE — 83605 ASSAY OF LACTIC ACID: CPT | Performed by: HOSPITALIST

## 2020-04-18 PROCEDURE — 12000000 ZZH R&B MED SURG/OB

## 2020-04-18 PROCEDURE — 99232 SBSQ HOSP IP/OBS MODERATE 35: CPT | Performed by: INTERNAL MEDICINE

## 2020-04-18 RX ORDER — ONDANSETRON 4 MG/1
4 TABLET, ORALLY DISINTEGRATING ORAL EVERY 6 HOURS PRN
Status: DISCONTINUED | OUTPATIENT
Start: 2020-04-18 | End: 2020-04-19 | Stop reason: HOSPADM

## 2020-04-18 RX ORDER — OXYCODONE HYDROCHLORIDE 5 MG/1
5 TABLET ORAL
Status: DISCONTINUED | OUTPATIENT
Start: 2020-04-18 | End: 2020-04-19

## 2020-04-18 RX ORDER — NALOXONE HYDROCHLORIDE 0.4 MG/ML
.1-.4 INJECTION, SOLUTION INTRAMUSCULAR; INTRAVENOUS; SUBCUTANEOUS
Status: DISCONTINUED | OUTPATIENT
Start: 2020-04-18 | End: 2020-04-19 | Stop reason: HOSPADM

## 2020-04-18 RX ORDER — HYDROMORPHONE HYDROCHLORIDE 1 MG/ML
0.5 INJECTION, SOLUTION INTRAMUSCULAR; INTRAVENOUS; SUBCUTANEOUS ONCE
Status: COMPLETED | OUTPATIENT
Start: 2020-04-18 | End: 2020-04-18

## 2020-04-18 RX ORDER — BISACODYL 10 MG
10 SUPPOSITORY, RECTAL RECTAL DAILY PRN
Status: DISCONTINUED | OUTPATIENT
Start: 2020-04-18 | End: 2020-04-19 | Stop reason: HOSPADM

## 2020-04-18 RX ORDER — CEFTRIAXONE 1 G/1
1 INJECTION, POWDER, FOR SOLUTION INTRAMUSCULAR; INTRAVENOUS EVERY 24 HOURS
Status: DISCONTINUED | OUTPATIENT
Start: 2020-04-18 | End: 2020-04-19 | Stop reason: HOSPADM

## 2020-04-18 RX ORDER — PROCHLORPERAZINE 25 MG
25 SUPPOSITORY, RECTAL RECTAL EVERY 12 HOURS PRN
Status: DISCONTINUED | OUTPATIENT
Start: 2020-04-18 | End: 2020-04-19 | Stop reason: HOSPADM

## 2020-04-18 RX ORDER — ONDANSETRON 2 MG/ML
4 INJECTION INTRAMUSCULAR; INTRAVENOUS EVERY 6 HOURS PRN
Status: DISCONTINUED | OUTPATIENT
Start: 2020-04-18 | End: 2020-04-19 | Stop reason: HOSPADM

## 2020-04-18 RX ORDER — ACETAMINOPHEN 325 MG/1
650 TABLET ORAL EVERY 4 HOURS PRN
Status: DISCONTINUED | OUTPATIENT
Start: 2020-04-18 | End: 2020-04-19 | Stop reason: HOSPADM

## 2020-04-18 RX ORDER — PHENAZOPYRIDINE HYDROCHLORIDE 100 MG/1
100 TABLET, FILM COATED ORAL
Status: DISCONTINUED | OUTPATIENT
Start: 2020-04-18 | End: 2020-04-19 | Stop reason: HOSPADM

## 2020-04-18 RX ORDER — PROCHLORPERAZINE MALEATE 10 MG
10 TABLET ORAL EVERY 6 HOURS PRN
Status: DISCONTINUED | OUTPATIENT
Start: 2020-04-18 | End: 2020-04-18

## 2020-04-18 RX ORDER — PROCHLORPERAZINE 25 MG
25 SUPPOSITORY, RECTAL RECTAL EVERY 12 HOURS PRN
Status: DISCONTINUED | OUTPATIENT
Start: 2020-04-18 | End: 2020-04-18

## 2020-04-18 RX ORDER — SODIUM CHLORIDE AND POTASSIUM CHLORIDE 150; 900 MG/100ML; MG/100ML
INJECTION, SOLUTION INTRAVENOUS CONTINUOUS
Status: DISCONTINUED | OUTPATIENT
Start: 2020-04-18 | End: 2020-04-19

## 2020-04-18 RX ORDER — PHENAZOPYRIDINE HYDROCHLORIDE 100 MG/1
100 TABLET, FILM COATED ORAL
Status: DISCONTINUED | OUTPATIENT
Start: 2020-04-18 | End: 2020-04-18

## 2020-04-18 RX ORDER — ACETAMINOPHEN 650 MG/1
650 SUPPOSITORY RECTAL EVERY 4 HOURS PRN
Status: DISCONTINUED | OUTPATIENT
Start: 2020-04-18 | End: 2020-04-19 | Stop reason: HOSPADM

## 2020-04-18 RX ORDER — SENNOSIDES 8.6 MG
1-2 TABLET ORAL 2 TIMES DAILY PRN
Status: DISCONTINUED | OUTPATIENT
Start: 2020-04-18 | End: 2020-04-19 | Stop reason: HOSPADM

## 2020-04-18 RX ORDER — PROCHLORPERAZINE MALEATE 5 MG
5 TABLET ORAL EVERY 6 HOURS PRN
Status: DISCONTINUED | OUTPATIENT
Start: 2020-04-18 | End: 2020-04-19 | Stop reason: HOSPADM

## 2020-04-18 RX ADMIN — ACETAMINOPHEN 650 MG: 325 TABLET, FILM COATED ORAL at 02:17

## 2020-04-18 RX ADMIN — CEFTRIAXONE SODIUM 1 G: 1 INJECTION, POWDER, FOR SOLUTION INTRAMUSCULAR; INTRAVENOUS at 12:33

## 2020-04-18 RX ADMIN — PHENAZOPYRIDINE HYDROCHLORIDE 100 MG: 100 TABLET ORAL at 17:58

## 2020-04-18 RX ADMIN — ONDANSETRON 4 MG: 2 INJECTION INTRAMUSCULAR; INTRAVENOUS at 03:57

## 2020-04-18 RX ADMIN — ACETAMINOPHEN 650 MG: 325 TABLET, FILM COATED ORAL at 08:40

## 2020-04-18 RX ADMIN — PHENAZOPYRIDINE HYDROCHLORIDE 100 MG: 100 TABLET ORAL at 14:27

## 2020-04-18 RX ADMIN — POTASSIUM CHLORIDE AND SODIUM CHLORIDE: 900; 150 INJECTION, SOLUTION INTRAVENOUS at 20:26

## 2020-04-18 RX ADMIN — POTASSIUM CHLORIDE AND SODIUM CHLORIDE: 900; 150 INJECTION, SOLUTION INTRAVENOUS at 09:56

## 2020-04-18 RX ADMIN — ACETAMINOPHEN 650 MG: 325 TABLET, FILM COATED ORAL at 21:39

## 2020-04-18 RX ADMIN — POTASSIUM CHLORIDE AND SODIUM CHLORIDE: 900; 150 INJECTION, SOLUTION INTRAVENOUS at 01:41

## 2020-04-18 RX ADMIN — ONDANSETRON 4 MG: 2 INJECTION INTRAMUSCULAR; INTRAVENOUS at 13:02

## 2020-04-18 RX ADMIN — OXYCODONE HYDROCHLORIDE 5 MG: 5 TABLET ORAL at 21:39

## 2020-04-18 RX ADMIN — HYDROMORPHONE HYDROCHLORIDE 0.5 MG: 1 INJECTION, SOLUTION INTRAMUSCULAR; INTRAVENOUS; SUBCUTANEOUS at 12:57

## 2020-04-18 RX ADMIN — OXYCODONE HYDROCHLORIDE 5 MG: 5 TABLET ORAL at 18:02

## 2020-04-18 RX ADMIN — PROCHLORPERAZINE EDISYLATE 10 MG: 5 INJECTION INTRAMUSCULAR; INTRAVENOUS at 08:45

## 2020-04-18 ASSESSMENT — ACTIVITIES OF DAILY LIVING (ADL)
ADLS_ACUITY_SCORE: 15
ADLS_ACUITY_SCORE: 13

## 2020-04-18 NOTE — PROGRESS NOTES
A&Ox4. Tachy, other VSS on RA. Bruising and scabs to skin. Emesis x1, IV zofran given. Tylenol given x1 for abdominal pain. NPO since 0130 for abdom ultrasound (needs to be NPO for 8 hrs). Assist x1. Refuses to answer/ignores staff at times. Sepsis risk fired, lactic 0.9. COVID-19 negative, isolation discontinued. Transferred to  d/t COVID being ruled out.
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
## 2020-04-18 NOTE — PLAN OF CARE
Disoriented to place and time, pt obtunded, awakes to gentle shaking. C/o abd pain, PRN tylenol given, avoiding narcotics, MD ordered 1x dose of IV dilaudid. C/o of pain when urinating, scheduled pyridium given. Clear liquid diet. Had 1 emesis, PRN Zofran and compazine, with relief. Voiding in bedpan/BR, UA collected. PIV infusing NS+K at 75, with int abx.  Last used heroine 4/17, prior to admission. Going through heroine withdrawal, clammy, anxious at times, restless legs noted. Abdominal US done today, see results. Up A1. Continue to monitor.

## 2020-04-18 NOTE — PROGRESS NOTES
RECEIVING UNIT ED HANDOFF REVIEW    ED Nurse Handoff Report was reviewed by: Santa Camacho RN on April 18, 2020 at 12:54 AM

## 2020-04-18 NOTE — ED NOTES
[facility]  ED Nurse Handoff Report    ED Chief complaint: Abdominal Pain      ED Diagnosis:   Final diagnoses:   Generalized abdominal pain   Paralytic ileus (H)   Heroin abuse (H)   Focal thickening of wall of gallbladder       Code Status: Full Code    Allergies:   Allergies   Allergen Reactions     Ibuprofen        Patient Story:  Abdominal pain  Focused Assessment:   Abdominal pain, tender to touch.  Patient sleeping during ED stay.  Awakens to verbal.  Tearful.  Homeless.  States she just rides the trains all the time.  Heroin user.  Last use this morning.  No pain medications given.  Did have 1 BM in just after arriving to ED.     Treatments and/or interventions provided:  IV insertion.    Patient's response to treatments and/or interventions:      To be done/followed up on inpatient unit:  Continuum of care    Does this patient have any cognitive concerns?: Alcohol/Drugs temporary cognitive concerns    Activity level - Baseline/Home:  Independent  Activity Level - Current:   Stand with assist    Patient's Preferred language: English   Needed?: No    Isolation:  Contact & Droplet  Infection: Covid test pending  Other   Bariatric?: No    Vital Signs:   Vitals:    04/17/20 1845 04/17/20 1900 04/17/20 1945 04/17/20 2000   BP: 132/79   124/77   Pulse: 105   107   Resp:       Temp:       TempSrc:       SpO2: 98% 100% 96% 96%   Weight:       Height:           Cardiac Rhythm:     Was the PSS-3 completed:   Yes  What interventions are required if any?               Family Comments:  None present  OBS brochure/video discussed/provided to patient/family: Yes              Name of person given brochure if not patient:               Relationship to patient:     For the majority of the shift this patient's behavior was Green.   Behavioral interventions performed were monitor, assess.    ED NURSE PHONE NUMBER: *[id number]

## 2020-04-18 NOTE — ED NOTES
"Patient incontinent of stool in the bed.  Per report from previous shift was \"1 large, hard chunk\".  Patient removed from bed & cleaned self.   "
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
## 2020-04-18 NOTE — PROGRESS NOTES
-diagnostic tests and consults completed and resulted (Not Met)   Ultrasound planned for today, 4/18/2020  -vital signs normal or at patient baseline (Not Met)   Pt. Still tachycardic  -tolerating oral intake to maintain hydration (Not Met)   Pt unable to drink fluids. Has not voided since at least 0130.   -adequate pain control on oral analgesics (Not met)   NOE pain due to LOC. Still groaning on abdomen palpation   -safe disposition plan has been identified (Not Met)   SW to follow

## 2020-04-18 NOTE — PROGRESS NOTES
MD Notification    Notified Person: MD    Notified Person Name: Dr. Yeung    Notification Date/Time: 4/18/2020 0249    Notification Interaction: Page/Phone    Purpose of Notification: COVID-19 negative.     Orders Received: Discontinue isolation precautions.     Comments:

## 2020-04-18 NOTE — PLAN OF CARE
Orientation: A&Ox4. Obtunded, inconsistently waking to sound/touch. Answers yes/no questions but falls asleep quickly.  Aggression Stop Light: Green  Mobility: Up with 1  Pain Management: Abd. Pain, tylenol ordered PRN. Trying to avoid narcotics.  Diet: NPO since 0130 for US.  Bowel/Bladder: Pt is due to void of urine, bladder scanned at 0600 and volume was 159.   Abnormal Lab/Assessments: CT showed possible paralytic ileus and gallbladder thickening  Drain/Device/Wound: PIV with NS & 20K running at 125.  Consults: Possible chem dep if pt is willing, SW.   D/C Day/Goals/Place: SW following for discharge plan  Other: Heroin use. Last used 4/17/2020 prior to admission. Pt is homeless as well. COVID tested, came back neg. Need urine sample for UA.

## 2020-04-18 NOTE — PHARMACY-ADMISSION MEDICATION HISTORY
Pharmacy Medication History  Admission medication history interview status for the 4/17/2020  admission is complete. See EPIC admission navigator for prior to admission medications     Medication history sources: Spoke w/ patient.  Reviewed patient chart.   Medication history source reliability: Moderate  Adherence assessment: Moderate    Additional medication history information:   - Patient states that she is not regularly taking any prescription or OTC medications at this time.      Medication reconciliation completed by provider prior to medication history? No    Time spent in this activity: 5 minutes    Arabella Russo, TiffanyD, BCPS

## 2020-04-19 ENCOUNTER — APPOINTMENT (OUTPATIENT)
Dept: GENERAL RADIOLOGY | Facility: CLINIC | Age: 30
DRG: 392 | End: 2020-04-19
Attending: INTERNAL MEDICINE
Payer: OTHER GOVERNMENT

## 2020-04-19 VITALS
BODY MASS INDEX: 37.03 KG/M2 | HEART RATE: 95 BPM | SYSTOLIC BLOOD PRESSURE: 113 MMHG | RESPIRATION RATE: 16 BRPM | DIASTOLIC BLOOD PRESSURE: 74 MMHG | HEIGHT: 63 IN | OXYGEN SATURATION: 99 % | TEMPERATURE: 95.3 F | WEIGHT: 209 LBS

## 2020-04-19 LAB
ERYTHROCYTE [DISTWIDTH] IN BLOOD BY AUTOMATED COUNT: 14.1 % (ref 10–15)
HCT VFR BLD AUTO: 37.6 % (ref 35–47)
HGB BLD-MCNC: 12.4 G/DL (ref 11.7–15.7)
MCH RBC QN AUTO: 29.2 PG (ref 26.5–33)
MCHC RBC AUTO-ENTMCNC: 33 G/DL (ref 31.5–36.5)
MCV RBC AUTO: 89 FL (ref 78–100)
PLATELET # BLD AUTO: 228 10E9/L (ref 150–450)
RBC # BLD AUTO: 4.25 10E12/L (ref 3.8–5.2)
WBC # BLD AUTO: 11.3 10E9/L (ref 4–11)

## 2020-04-19 PROCEDURE — 25000128 H RX IP 250 OP 636: Performed by: INTERNAL MEDICINE

## 2020-04-19 PROCEDURE — 25000132 ZZH RX MED GY IP 250 OP 250 PS 637: Performed by: INTERNAL MEDICINE

## 2020-04-19 PROCEDURE — 36415 COLL VENOUS BLD VENIPUNCTURE: CPT | Performed by: INTERNAL MEDICINE

## 2020-04-19 PROCEDURE — 99217 ZZC OBSERVATION CARE DISCHARGE: CPT | Performed by: INTERNAL MEDICINE

## 2020-04-19 PROCEDURE — G0378 HOSPITAL OBSERVATION PER HR: HCPCS

## 2020-04-19 PROCEDURE — 85027 COMPLETE CBC AUTOMATED: CPT | Performed by: INTERNAL MEDICINE

## 2020-04-19 PROCEDURE — 74018 RADEX ABDOMEN 1 VIEW: CPT

## 2020-04-19 PROCEDURE — 25000132 ZZH RX MED GY IP 250 OP 250 PS 637: Performed by: HOSPITALIST

## 2020-04-19 RX ORDER — CEFUROXIME AXETIL 500 MG/1
500 TABLET ORAL 2 TIMES DAILY
Qty: 10 TABLET | Refills: 0 | Status: SHIPPED | OUTPATIENT
Start: 2020-04-19 | End: 2020-04-24

## 2020-04-19 RX ORDER — OXYCODONE HYDROCHLORIDE 5 MG/1
5 TABLET ORAL EVERY 4 HOURS PRN
Status: DISCONTINUED | OUTPATIENT
Start: 2020-04-19 | End: 2020-04-19 | Stop reason: HOSPADM

## 2020-04-19 RX ADMIN — OXYCODONE HYDROCHLORIDE 5 MG: 5 TABLET ORAL at 08:21

## 2020-04-19 RX ADMIN — ACETAMINOPHEN 650 MG: 325 TABLET, FILM COATED ORAL at 07:58

## 2020-04-19 RX ADMIN — OXYCODONE HYDROCHLORIDE 5 MG: 5 TABLET ORAL at 05:05

## 2020-04-19 RX ADMIN — ACETAMINOPHEN 650 MG: 325 TABLET, FILM COATED ORAL at 03:05

## 2020-04-19 RX ADMIN — CEFTRIAXONE SODIUM 1 G: 1 INJECTION, POWDER, FOR SOLUTION INTRAMUSCULAR; INTRAVENOUS at 12:06

## 2020-04-19 RX ADMIN — POTASSIUM CHLORIDE AND SODIUM CHLORIDE: 900; 150 INJECTION, SOLUTION INTRAVENOUS at 09:44

## 2020-04-19 RX ADMIN — PHENAZOPYRIDINE HYDROCHLORIDE 100 MG: 100 TABLET ORAL at 12:06

## 2020-04-19 RX ADMIN — PHENAZOPYRIDINE HYDROCHLORIDE 100 MG: 100 TABLET ORAL at 07:59

## 2020-04-19 RX ADMIN — OXYCODONE HYDROCHLORIDE 5 MG: 5 TABLET ORAL at 12:06

## 2020-04-19 ASSESSMENT — ACTIVITIES OF DAILY LIVING (ADL)
ADLS_ACUITY_SCORE: 13

## 2020-04-19 NOTE — PLAN OF CARE
DATE & TIME: 4/18/20 6768-9870  Cognitive Concerns/ Orientation : Disoriented to place and time   BEHAVIOR & AGGRESSION TOOL COLOR: Green, somnolent   CIWA SCORE: NA  ABNL VS/O2: VSS on RA  MOBILITY: A x1  PAIN MANAGMENT: PRN Tylenol and Oxy both given x1 for abdominal pain  DIET: Clear liquid   BOWEL/BLADDER: Continent of bladder. No BM this shift. Pt requests bedpan. Hypoactive bowel sounds.   ABNL LAB/BG: ALT 88, UTI   DRAIN/DEVICES: PIV L arm .9NS KCL20 75mL/hr  TELEMETRY RHYTHM: NA  SKIN: Bruised   TESTS/PROCEDURES: None scheduled   D/C DAY/GOALS/PLACE: Pending. Pt reports being homeless.   OTHER IMPORTANT INFO: No withdrawal s/sx present this shift.

## 2020-04-19 NOTE — DISCHARGE SUMMARY
Patient discharged at 3:09 PM, given shelter resources, pt heading to [facility] due to son's need for surgery.  IV was discontinued. Pain at time of discharge was 0/10. Belongings returned to patient.  Discharge instructions and medications reviewed with patient.  Patient verbalized understanding and all questions were answered.  Prescriptions given to patient.  At time of discharge, patient condition was stable and left the unit in wheelchair escorted by CNA.

## 2020-04-19 NOTE — CONSULTS
4/19/20 CD consult acknowledged. Pt does not have insurance, will need to apply for consolidated funding through [facility]. Writer emailed funding application to .     Vanessa Cordon, [facility]  [phone]

## 2020-04-19 NOTE — PROGRESS NOTES
PAVAN    I) Em [facility] Dep Counselor emailed [facility] funding application for CD treatment to . This was printed and provided to patient for completion. Bedside RN was updated and asked to call writer when completed. It needs to be emailed back to [email or web address]. Patient will then be seen for a CD evaluation.     P) Following.    Addendum  Patient reports she needs to get to [facility] because her son is having surgery. Provided her the [facility] phone number so she can call herself to obtain a shelter bed. Patient signed a Release of Information so a cab ride could be arranged. [employer] will provide transport to [facility] on the hospital's account.Cancelled the CD evaluation.

## 2020-04-19 NOTE — PLAN OF CARE
Disoriented to place and time, pt somnolent awakens to voice. C/o abd pain, PRN tylenol given x1, Oxycodone x2 per pt. Request. MD ordered 1x dose of IV dilaudid on previous shift. C/oOof pain when urinating, scheduled pyridium given. Clear liquid diet. Denies nausea this shift. Voiding in bedpan/incontinent x1. PIV infusing NS+K at 75, with int abx.  Last used heroine 4/17, prior to admission. Going through heroine withdrawal with anxiety at times, restless legs noted.  VS stable. Abdominal US done today, see results. Refused OOB this shift.
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
## 2020-04-24 LAB
BACTERIA SPEC CULT: NO GROWTH
BACTERIA SPEC CULT: NO GROWTH
SPECIMEN SOURCE: NORMAL
SPECIMEN SOURCE: NORMAL

## 2021-01-29 NOTE — MR AVS SNAPSHOT
After Visit Summary   1/11/2018    Keyona Fisher    MRN: [id number]           Patient Information     Date Of Birth          1990        Visit Information        Provider Department      1/11/2018 4:00 PM Gaurav Vaz MD [facility] [place]        Today's Diagnoses     FRANCESCA (amniotic fluid index) borderline low    -  1    Morbid obesity (H)        Substance use disorder           Follow-ups after your visit        Your next 10 appointments already scheduled     Jan 12, 2018  3:00 PM CST   (Arrive by 2:45 PM)   ANDRE BPP SINGLE with [id number]   [facility] - [facility])    [address]  [facility] [address]   [phone]            Jan 12, 2018  3:30 PM CST   Radiology MD with ANH POWELL MD   [facility] - [facility])    [address]  [facility] [address]   [phone]           Please arrive at the time given for your first appointment. This visit is used internally to schedule the physician's time during your ultrasound.            Jan 18, 2018  3:30 PM CST   (Arrive by 3:15 PM)   ANDRE BPBOGDAN SINGLE with [email or web address]   [facility] [place] ([facility])    [address]  [facility] [address]   [phone]            Jan 18, 2018  4:00 PM CST   Radiology MD with ANH POWELL MD   [facility] [place] ([facility])    [address]  [facility] [address]   [phone]           Please arrive at the time given for your first appointment. This visit is used internally to schedule the physician's time during your ultrasound.            Jan 25, 2018 11:45 AM CST   (Arrive by 11:30 AM)   ANDRE JUARES COMPRE SINGLE F/U with [email or web address]   [facility] Ul[facility] -  [Home] : at home, [unfilled] , at the time of the visit. "[place] ([facility])    [address]  [facility] [address]   [phone]           Wear comfortable clothes and leave your valuables at home.            Jan 25, 2018 12:15 PM CST   Radiology MD with UR ANDRE PADILLA   [facility] Maternal Fetal Medicine - [place] ([facility])    [address]  [facility] [address]   [phone]           Please arrive at the time given for your first appointment. This visit is used internally to schedule the physician's time during your ultrasound.              Future tests that were ordered for you today     Open Future Orders        Priority Expected Expires Ordered    MFM BPP Single Routine  11/11/2018 1/11/2018    Maternal Fetal BPP with NST Single Routine 1/11/2018 11/4/2018 1/4/2018            Who to contact     If you have questions or need follow up information about today's clinic visit or your schedule please contact [facility]AL [facility]IC[facility] directly at [phone].  Normal or non-critical lab and imaging results will be communicated to you by Inoappshart, letter or phone within 4 business days after the clinic has received the results. If you do not hear from us within 7 days, please contact the clinic through [employer] POSt or phone. If you have a critical or abnormal lab result, we will notify you by phone as soon as possible.  Submit refill requests through Dealdrive or call your pharmacy and they will forward the refill request to us. Please allow 3 business days for your refill to be completed.          Additional Information About Your Visit        Dealdrive Information     De[employer]rive lets you send messages to your doctor, view your test results, renew your prescriptions, schedule appointments and more. To sign up, go to [email or web address] . Click on \"Log in\" on the left side of the screen, which will take you to the Welcome page. Then click on \"Sign up Now\" on the right " [Other Location: e.g. Home (Enter Location, City,State)___] : at [unfilled] [Other:____] : [unfilled] side of the page.     You will be asked to enter the access code listed below, as well as some personal information. Please follow the directions to create your username and password.     Your access code is: [email or web address]  Expires: 2018 11:06 AM     Your access code will  in 90 days. If you need help or a new code, please call your [facility] or [phone].        Care EveryWhere ID     This is your Care EveryWhere ID. This could be used by other organizations to access your [facility] medical records  [id number]        Your Vitals Were     Last Period                   2017 (Approximate)            Blood Pressure from Last 3 Encounters:   18 107/51   17 (!) 123/96   17 139/85    Weight from Last 3 Encounters:   17 124 kg (273 lb 6.4 oz)   16 108.9 kg (240 lb)               Primary Care Provider Office Phone #     [facility] [phone]       [address] [place] [id number]        Equal Access to Services     [facility]: Hadii aad ku hadasho Soomaali, waaxda luqadaha, qaybta kaalmada adeegyada, dilshad jimenez . So [facility] [phone].    ATENCIÓN: Si habla español, tiene a slater disposición servicios gratuitos de asistencia lingüística. Licha al [phone].    We comply with applicable federal civil rights laws and [place] laws. We do not discriminate on the basis of race, color, national origin, age, disability, sex, sexual orientation, or gender identity.            Thank you!     Thank you for choosing [facility]  for your care. Our goal is always to provide you with excellent care. Hearing back from our patients is one way we can continue to improve our services. Please take a few minutes to complete the written survey that you may receive in the mail after your visit with us. Thank you!             Your Updated Medication List - Protect others around you: Learn how  [Verbal consent obtained from patient] : the patient, [unfilled] to safely use, store and throw away your medicines at www.disposemymeds.org.          This list is accurate as of: 1/11/18 11:59 PM.  Always use your most recent med list.                   Brand Name Dispense Instructions for use Diagnosis    acetaminophen 500 MG Caps      Take 2 tablets by mouth every 6 hours as needed        prenatal multivitamin plus iron 27-0.8 MG Tabs per tablet      Take 1 tablet by mouth daily        traMADol 50 MG tablet    ULTRAM    15 tablet    Take 1-2 tablets ( mg) by mouth every 6 hours as needed for pain

## 2021-05-03 ENCOUNTER — HOSPITAL ENCOUNTER (EMERGENCY)
Facility: CLINIC | Age: 31
Discharge: HOME | End: 2021-05-03
Attending: EMERGENCY MEDICINE | Admitting: EMERGENCY MEDICINE
Payer: COMMERCIAL

## 2021-05-03 VITALS
RESPIRATION RATE: 16 BRPM | SYSTOLIC BLOOD PRESSURE: 126 MMHG | TEMPERATURE: 97.6 F | OXYGEN SATURATION: 100 % | DIASTOLIC BLOOD PRESSURE: 82 MMHG | HEART RATE: 89 BPM

## 2021-05-03 DIAGNOSIS — F19.10 POLYSUBSTANCE ABUSE (H): ICD-10-CM

## 2021-05-03 LAB
ALBUMIN SERPL-MCNC: 3.4 G/DL (ref 3.4–5)
ALP SERPL-CCNC: 87 U/L (ref 40–150)
ALT SERPL W P-5'-P-CCNC: 140 U/L (ref 0–50)
AMPHETAMINES UR QL SCN: POSITIVE
ANION GAP SERPL CALCULATED.3IONS-SCNC: 6 MMOL/L (ref 3–14)
AST SERPL W P-5'-P-CCNC: 71 U/L (ref 0–45)
BARBITURATES UR QL: NEGATIVE
BASOPHILS # BLD AUTO: 0 10E9/L (ref 0–0.2)
BASOPHILS NFR BLD AUTO: 0.4 %
BENZODIAZ UR QL: NEGATIVE
BILIRUB SERPL-MCNC: 0.3 MG/DL (ref 0.2–1.3)
BUN SERPL-MCNC: 13 MG/DL (ref 7–30)
CALCIUM SERPL-MCNC: 8.6 MG/DL (ref 8.5–10.1)
CANNABINOIDS UR QL SCN: POSITIVE
CHLORIDE SERPL-SCNC: 109 MMOL/L (ref 94–109)
CO2 SERPL-SCNC: 26 MMOL/L (ref 20–32)
COCAINE UR QL: NEGATIVE
CREAT SERPL-MCNC: 0.68 MG/DL (ref 0.52–1.04)
DIFFERENTIAL METHOD BLD: NORMAL
EOSINOPHIL # BLD AUTO: 0.2 10E9/L (ref 0–0.7)
EOSINOPHIL NFR BLD AUTO: 4.5 %
ERYTHROCYTE [DISTWIDTH] IN BLOOD BY AUTOMATED COUNT: 13.6 % (ref 10–15)
ETHANOL SERPL-MCNC: <0.01 G/DL
ETHANOL UR QL SCN: NEGATIVE
GFR SERPL CREATININE-BSD FRML MDRD: >90 ML/MIN/{1.73_M2}
GLUCOSE SERPL-MCNC: 98 MG/DL (ref 70–99)
HCG SERPL QL: NEGATIVE
HCT VFR BLD AUTO: 36.4 % (ref 35–47)
HGB BLD-MCNC: 12.2 G/DL (ref 11.7–15.7)
IMM GRANULOCYTES # BLD: 0 10E9/L (ref 0–0.4)
IMM GRANULOCYTES NFR BLD: 0.4 %
LYMPHOCYTES # BLD AUTO: 1.9 10E9/L (ref 0.8–5.3)
LYMPHOCYTES NFR BLD AUTO: 39.2 %
MCH RBC QN AUTO: 29 PG (ref 26.5–33)
MCHC RBC AUTO-ENTMCNC: 33.5 G/DL (ref 31.5–36.5)
MCV RBC AUTO: 87 FL (ref 78–100)
MONOCYTES # BLD AUTO: 0.5 10E9/L (ref 0–1.3)
MONOCYTES NFR BLD AUTO: 10.3 %
NEUTROPHILS # BLD AUTO: 2.2 10E9/L (ref 1.6–8.3)
NEUTROPHILS NFR BLD AUTO: 45.2 %
NRBC # BLD AUTO: 0 10*3/UL
NRBC BLD AUTO-RTO: 0 /100
OPIATES UR QL SCN: POSITIVE
PLATELET # BLD AUTO: 225 10E9/L (ref 150–450)
POTASSIUM SERPL-SCNC: 3.6 MMOL/L (ref 3.4–5.3)
PROT SERPL-MCNC: 7.6 G/DL (ref 6.8–8.8)
RBC # BLD AUTO: 4.21 10E12/L (ref 3.8–5.2)
SODIUM SERPL-SCNC: 141 MMOL/L (ref 133–144)
WBC # BLD AUTO: 4.9 10E9/L (ref 4–11)

## 2021-05-03 PROCEDURE — 80053 COMPREHEN METABOLIC PANEL: CPT | Performed by: EMERGENCY MEDICINE

## 2021-05-03 PROCEDURE — 99283 EMERGENCY DEPT VISIT LOW MDM: CPT | Performed by: EMERGENCY MEDICINE

## 2021-05-03 PROCEDURE — 84703 CHORIONIC GONADOTROPIN ASSAY: CPT | Performed by: EMERGENCY MEDICINE

## 2021-05-03 PROCEDURE — 80307 DRUG TEST PRSMV CHEM ANLYZR: CPT | Performed by: EMERGENCY MEDICINE

## 2021-05-03 PROCEDURE — 80320 DRUG SCREEN QUANTALCOHOLS: CPT | Performed by: EMERGENCY MEDICINE

## 2021-05-03 PROCEDURE — 99284 EMERGENCY DEPT VISIT MOD MDM: CPT | Performed by: EMERGENCY MEDICINE

## 2021-05-03 PROCEDURE — 85025 COMPLETE CBC W/AUTO DIFF WBC: CPT | Performed by: EMERGENCY MEDICINE

## 2021-05-03 PROCEDURE — 82077 ASSAY SPEC XCP UR&BREATH IA: CPT | Performed by: EMERGENCY MEDICINE

## 2021-05-03 ASSESSMENT — ENCOUNTER SYMPTOMS: SHORTNESS OF BREATH: 0

## 2021-05-03 NOTE — ED PROVIDER NOTES
ED Provider Note  [facility]      History     Chief Complaint   Patient presents with     Addiction Problem     patient accidently swallowed 1 gm of meth      The history is provided by the patient and medical records.     Keyona Fisher is a 30 year old female who states that she was in the process of being arrested by police but did not want to go to group home so she swallowed a shard of meth that she had popped into her mouth to avoid being arrested for it. Patient states that she normally uses meth as well as heroin and states that this shard was approximately 2 times what her normal dose of meth is.  Patient presents here now saying that she has frequent stools because with the opiates she has been very constipated but now that she is taken off of meth she has been stooling several times in the last hour. Patient denies any other ingestions and denies any bagging.  The patient denies any chest pain denies any shortness of breath.    This part of the medical record was transcribed by Neela Haque Medical Scribe, from a dictation done by Rey Pierson MD.     Past Medical History  Past Medical History:   Diagnosis Date     Depression      Heroin use      PTSD (post-traumatic stress disorder)      Tobacco dependence      History reviewed. No pertinent surgical history.  No current outpatient medications on file.    Allergies   Allergen Reactions     Ibuprofen      Family History  Family History   Problem Relation Age of Onset     Diabetes Mother      Coronary Artery Disease Father      Diabetes Father      Coronary Artery Disease Maternal Grandmother      Coronary Artery Disease Maternal Grandfather      Social History   Social History     Tobacco Use     Smoking status: Current Every Day Smoker     Packs/day: 0.25     Smokeless tobacco: Never Used   Substance Use Topics     Alcohol use: Yes     Comment: occasionally      Drug use: Yes     Types: Marijuana, Opiates, IV,  Amphetamines     Comment: 1/2 gram fentanyl (pt thought it was heroin but just found out it's fentanyl) daily for last 5 months      Past medical history, past surgical history, medications, allergies, family history, and social history were reviewed with the patient. No additional pertinent items.       Review of Systems   Respiratory: Negative for shortness of breath.    Cardiovascular: Negative for chest pain.   All other systems reviewed and are negative.    A complete review of systems was performed with pertinent positives and negatives noted in the HPI, and all other systems negative.    Physical Exam   BP: 126/82  Pulse: 89  Temp: 97.6  F (36.4  C)  Resp: 16  SpO2: 100 %     Physical Exam  Vitals signs and nursing note reviewed.   Constitutional:       Appearance: She is not ill-appearing.   HENT:      Head: Atraumatic.   Eyes:      Extraocular Movements: Extraocular movements intact.      Pupils: Pupils are equal, round, and reactive to light.   Neck:      Musculoskeletal: Neck supple.   Cardiovascular:      Rate and Rhythm: Regular rhythm.      Heart sounds: Normal heart sounds.   Pulmonary:      Breath sounds: Normal breath sounds.   Abdominal:      Palpations: Abdomen is soft.      Tenderness: There is no abdominal tenderness.   Musculoskeletal:         General: No deformity.      Comments: Patient does have needle tracks on her forearms   Skin:     Comments: See musculoskeletal exam   Neurological:      General: No focal deficit present.      Mental Status: She is alert and oriented to person, place, and time.   Psychiatric:         Mood and Affect: Mood normal.         ED Course      Procedures            Results for orders placed or performed during the hospital encounter of 05/03/21   CBC with platelets differential     Status: None   Result Value Ref Range    WBC 4.9 4.0 - 11.0 10e9/L    RBC Count 4.21 3.8 - 5.2 10e12/L    Hemoglobin 12.2 11.7 - 15.7 g/dL    Hematocrit 36.4 35.0 - 47.0 %    MCV 87  78 - 100 fl    MCH 29.0 26.5 - 33.0 pg    MCHC 33.5 31.5 - 36.5 g/dL    RDW 13.6 10.0 - 15.0 %    Platelet Count 225 150 - 450 10e9/L    Diff Method Automated Method     % Neutrophils 45.2 %    % Lymphocytes 39.2 %    % Monocytes 10.3 %    % Eosinophils 4.5 %    % Basophils 0.4 %    % Immature Granulocytes 0.4 %    Nucleated RBCs 0 0 /100    Absolute Neutrophil 2.2 1.6 - 8.3 10e9/L    Absolute Lymphocytes 1.9 0.8 - 5.3 10e9/L    Absolute Monocytes 0.5 0.0 - 1.3 10e9/L    Absolute Eosinophils 0.2 0.0 - 0.7 10e9/L    Absolute Basophils 0.0 0.0 - 0.2 10e9/L    Abs Immature Granulocytes 0.0 0 - 0.4 10e9/L    Absolute Nucleated RBC 0.0    Comprehensive metabolic panel     Status: Abnormal   Result Value Ref Range    Sodium 141 133 - 144 mmol/L    Potassium 3.6 3.4 - 5.3 mmol/L    Chloride 109 94 - 109 mmol/L    Carbon Dioxide 26 20 - 32 mmol/L    Anion Gap 6 3 - 14 mmol/L    Glucose 98 70 - 99 mg/dL    Urea Nitrogen 13 7 - 30 mg/dL    Creatinine 0.68 0.52 - 1.04 mg/dL    GFR Estimate >90 >60 mL/min/[1.73_m2]    GFR Estimate If Black >90 >60 mL/min/[1.73_m2]    Calcium 8.6 8.5 - 10.1 mg/dL    Bilirubin Total 0.3 0.2 - 1.3 mg/dL    Albumin 3.4 3.4 - 5.0 g/dL    Protein Total 7.6 6.8 - 8.8 g/dL    Alkaline Phosphatase 87 40 - 150 U/L     (H) 0 - 50 U/L    AST 71 (H) 0 - 45 U/L   Alcohol ethyl     Status: None   Result Value Ref Range    Ethanol g/dL <0.01 <0.01 g/dL   HCG qualitative Blood     Status: None   Result Value Ref Range    HCG Qualitative Serum Negative NEG^Negative   Drug abuse screen 6 urine (tox)     Status: Abnormal   Result Value Ref Range    Amphetamine Qual Urine Positive (A) NEG^Negative    Barbiturates Qual Urine Negative NEG^Negative    Benzodiazepine Qual Urine Negative NEG^Negative    Cannabinoids Qual Urine Positive (A) NEG^Negative    Cocaine Qual Urine Negative NEG^Negative    Ethanol Qual Urine Negative NEG^Negative    Opiates Qualitative Urine Positive (A) NEG^Negative     Medications    sodium chloride (PF) 0.9% PF flush 3 mL (has no administration in time range)   sodium chloride (PF) 0.9% PF flush 3 mL (has no administration in time range)   0.9% sodium chloride BOLUS (has no administration in time range)        Assessments & Plan (with Medical Decision Making)     I have reviewed the nursing notes.    Patient denies body packing and states that the dose of meth she took is only twice that of her normal amount.  The patient remained comfortable and not tachycardic here in the ER.  Patient was going to be worked up further but the patient states that she has a custody hearing in court that she has to be at and wishes to leave the ER.  At this time the patient be sent home per her wishes prior to her complete evaluation being finished.    Orders Placed This Encounter   Procedures     CBC with platelets differential     Comprehensive metabolic panel     Alcohol ethyl     HCG qualitative Blood     Drug abuse screen 6 urine (tox)     MENTAL HEALTH REFERRAL  - Adult; Addiction Medicine Provider; Addiction Medicine Evaluation & Treatment; Addiction Medicine Consultation, Evaluation & Treatment [phone]; Meth/Amphetamines, Opioids; Medication Assisted Treatment: Opioids; We...     Cardiac Continuous Monitoring     Peripheral IV catheter         I have reviewed the findings, diagnosis, plan and need for follow up with the patient.        Final diagnoses:   Polysubstance abuse (H)     Please make an appointment to follow up with your primary care clinic or our Addiction Clinic should you want help with your substance abuse.    Referral was placed into the computer system for you to be contacted by the addiction clinic.    Return to the ER for questions or problems.    Routine discharge instructions were given for this diagnosis  --  Rey Pierson Md  [facility] EMERGENCY DEPARTMENT  5/3/2021     Rey Pierson MD  05/03/21 [id number]

## 2021-05-03 NOTE — DISCHARGE INSTRUCTIONS
Please make an appointment to follow up with your primary care clinic or our Addiction Clinic should you want help with your substance abuse.    Referral was placed into the computer system for you to be contacted by the addiction clinic.    Return to the ER for questions or problems.

## 2021-05-03 NOTE — ED NOTES
Bed: ED07  Expected date:   Expected time:   Means of arrival:   Comments:  Brennan 34 F Swallowed meth

## 2021-05-10 ENCOUNTER — TELEPHONE (OUTPATIENT)
Dept: BEHAVIORAL HEALTH | Facility: CLINIC | Age: 31
End: 2021-05-10

## 2021-05-10 NOTE — TELEPHONE ENCOUNTER
Attempted to call patients to offer Recovery Clinic services. Phone # on file is for someone else.     [facility]  [address], [address]   [place], [place], [address]  Phone: [phone]  Fax: [phone]    Open Mon, Wed, Fridays  9:00am-4:00pm  Walk in hours: 9am-3pm    Beena Camacho RN on 5/10/2021 at 10:48 AM

## 2021-06-21 ENCOUNTER — HOSPITAL ENCOUNTER (EMERGENCY)
Facility: CLINIC | Age: 31
Discharge: HOME | End: 2021-06-21
Attending: EMERGENCY MEDICINE | Admitting: EMERGENCY MEDICINE
Payer: COMMERCIAL

## 2021-06-21 ENCOUNTER — TELEPHONE (OUTPATIENT)
Dept: EMERGENCY MEDICINE | Facility: CLINIC | Age: 31
End: 2021-06-21

## 2021-06-21 VITALS
HEART RATE: 72 BPM | RESPIRATION RATE: 14 BRPM | SYSTOLIC BLOOD PRESSURE: 102 MMHG | DIASTOLIC BLOOD PRESSURE: 66 MMHG | TEMPERATURE: 98 F | OXYGEN SATURATION: 98 %

## 2021-06-21 DIAGNOSIS — J36 PERITONSILLAR CELLULITIS: ICD-10-CM

## 2021-06-21 LAB
DEPRECATED S PYO AG THROAT QL EIA: NEGATIVE
LABORATORY COMMENT REPORT: NORMAL
SARS-COV-2 RNA RESP QL NAA+PROBE: NEGATIVE
SPECIMEN SOURCE: ABNORMAL
SPECIMEN SOURCE: NORMAL
SPECIMEN SOURCE: NORMAL
STREP GROUP A PCR: ABNORMAL

## 2021-06-21 PROCEDURE — 250N000013 HC RX MED GY IP 250 OP 250 PS 637: Performed by: EMERGENCY MEDICINE

## 2021-06-21 PROCEDURE — C9803 HOPD COVID-19 SPEC COLLECT: HCPCS | Performed by: EMERGENCY MEDICINE

## 2021-06-21 PROCEDURE — 99283 EMERGENCY DEPT VISIT LOW MDM: CPT | Performed by: EMERGENCY MEDICINE

## 2021-06-21 PROCEDURE — 87651 STREP A DNA AMP PROBE: CPT | Performed by: EMERGENCY MEDICINE

## 2021-06-21 PROCEDURE — 87635 SARS-COV-2 COVID-19 AMP PRB: CPT | Performed by: EMERGENCY MEDICINE

## 2021-06-21 PROCEDURE — 999N001174 HC STATISTIC STREP A RAPID: Performed by: EMERGENCY MEDICINE

## 2021-06-21 PROCEDURE — 99284 EMERGENCY DEPT VISIT MOD MDM: CPT | Performed by: EMERGENCY MEDICINE

## 2021-06-21 RX ORDER — AMOXICILLIN AND CLAVULANATE POTASSIUM 400; 57 MG/5ML; MG/5ML
875 POWDER, FOR SUSPENSION ORAL 2 TIMES DAILY
Qty: 220 ML | Refills: 0 | Status: SHIPPED | OUTPATIENT
Start: 2021-06-21 | End: 2021-07-01

## 2021-06-21 ASSESSMENT — ENCOUNTER SYMPTOMS
SLEEP DISTURBANCE: 0
NAUSEA: 0
DIFFICULTY URINATING: 0
BACK PAIN: 0
COUGH: 0
FEVER: 0
NECK PAIN: 0
SHORTNESS OF BREATH: 0
SORE THROAT: 1
HEADACHES: 0
ABDOMINAL PAIN: 0
VOMITING: 0
EYE REDNESS: 0
DYSURIA: 0

## 2021-06-21 NOTE — LETTER
June 24, 2021        Keyona Fisher  [address] [place] [address]          Dear Keyona Fisher:    You were seen in the [facility] Emergency Department on June 21, 2021 and we have been unable to reach you by phone, so we are sending you this letter.     It is important that you call [facility] Emergency Department lab result nurse at [phone], as we have information to relay to you AND/OR we MAY have to make some changes in your treatment.    Best time to call back is between 9 a.m. and 5:30 p.m, 7 days a week.      Sincerely,     [facility] Emergency Department Lab Result RN  [phone]

## 2021-06-21 NOTE — TELEPHONE ENCOUNTER
[facility] Emergency Department Lab result notification:    Reason for call  Notify of lab result  Lab Result  Group A Streptococcus PCR is POSITIVE.  [facility] Emergency Dept discharge antibiotic: amoxicillin-clavulanate (AUGMENTIN) 400-57 MG/5ML suspension, Take 10.9 mLs (875 mg) by mouth 2 times daily for 10 days  Recommendations in Treatment per [facility] ED Lab Result Strep group A protocol.   ED visit Date: 6/21/21  Symptoms reported at ED visit Pharyngitis        Patient presents with right-sided sore throat pain that radiates to the right ear. Pain and swelling present for 2 days.     Otalgia      HPI  Keyona Fisher is a 30 year old female with a history of polysubstance abuse who presents to the emergency department with 2-day history of sore throat.  The patient complains of right-sided throat pain with radiation to the right ear.  She has been taking acetaminophen for her discomfort.  She denies any fever.  She is able to swallow solids and liquids but does state that it is painful.  She denies any ill exposures.  She denies a history of Covid or Covid vaccination.  She denies any chest pain or dyspnea.  No cough.  No abdominal pain.  No nausea or vomiting.     Miscellaneous information      Current symptoms  3:38PM: Left voicemail message requesting a call back to [facility] ED Lab Result RN at [phone].  RN is available every day between 9 a.m. and 5:30 p.m.    Evangelina Hyanes RN  [facility] [employer] [place]  Emergency Dept Lab Result RN  Ph# [phone]     Copy of Lab result   Group A Streptococcus PCR Throat Swab  Order: [id number]  Status:  Final result   Visible to patient:  No (not released) Dx:  Peritonsillar cellulitis  Specimen Information: Throat          Ref Range & Units 11:52 AM 1yr ago    Specimen Description  Throat  Blood RPIV     Strep Group A PCR NDET^Not Detected Detected, Abnormal ResultAbnormal      Comment: Group A Streptococcus  DNA detected.   FDA approved assay performed using [employer] GeneXpert real-time PCR.    Resulting Agency  [facility]         Specimen Collected: 06/21/21 11:52 AM Last Resulted: 06/21/21  3:10 PM

## 2021-06-21 NOTE — ED PROVIDER NOTES
ED Provider Note  [facility]      History     Chief Complaint   Patient presents with     Pharyngitis     Patient presents with right-sided sore throat pain that radiates to the right ear. Pain and swelling present for 2 days.     Otalgia     HPI  Keyona Fisher is a 30 year old female with a history of polysubstance abuse who presents to the emergency department with 2-day history of sore throat.  The patient complains of right-sided throat pain with radiation to the right ear.  She has been taking acetaminophen for her discomfort.  She denies any fever.  She is able to swallow solids and liquids but does state that it is painful.  She denies any ill exposures.  She denies a history of Covid or Covid vaccination.  She denies any chest pain or dyspnea.  No cough.  No abdominal pain.  No nausea or vomiting.    Past Medical History  Past Medical History:   Diagnosis Date     Depression      Heroin use      PTSD (post-traumatic stress disorder)      Tobacco dependence      History reviewed. No pertinent surgical history.  amoxicillin-clavulanate (AUGMENTIN) 400-57 MG/5ML suspension      Allergies   Allergen Reactions     Ibuprofen      Family History  Family History   Problem Relation Age of Onset     Diabetes Mother      Coronary Artery Disease Father      Diabetes Father      Coronary Artery Disease Maternal Grandmother      Coronary Artery Disease Maternal Grandfather      Social History   Social History     Tobacco Use     Smoking status: Current Every Day Smoker     Packs/day: 0.25     Smokeless tobacco: Never Used   Substance Use Topics     Alcohol use: Yes     Comment: occasionally      Drug use: Yes     Types: Marijuana      Past medical history, past surgical history, medications, allergies, family history, and social history were reviewed with the patient. No additional pertinent items.       Review of Systems   Constitutional: Negative for fever.   HENT: Positive for ear pain and  sore throat.    Eyes: Negative for redness.   Respiratory: Negative for cough and shortness of breath.    Cardiovascular: Negative for chest pain.   Gastrointestinal: Negative for abdominal pain, nausea and vomiting.   Genitourinary: Negative for difficulty urinating and dysuria.   Musculoskeletal: Negative for back pain and neck pain.   Skin: Negative for rash.   Neurological: Negative for headaches.   Psychiatric/Behavioral: Negative for sleep disturbance.   All other systems reviewed and are negative.    A complete review of systems was performed with pertinent positives and negatives noted in the HPI, and all other systems negative.    Physical Exam   BP: 94/62  Pulse: 66  Temp: 97.8  F (36.6  C)  Resp: 14  SpO2: 98 %  Physical Exam  Vitals signs and nursing note reviewed.   Constitutional:       Appearance: She is well-developed.   HENT:      Head: Normocephalic and atraumatic.      Right Ear: Tympanic membrane and ear canal normal.      Left Ear: Tympanic membrane and ear canal normal.      Mouth/Throat:      Mouth: Mucous membranes are moist.      Pharynx: Uvula midline. Posterior oropharyngeal erythema present. No pharyngeal swelling or uvula swelling.      Tonsils: Tonsillar exudate present. No tonsillar abscesses.     Neck:      Musculoskeletal: Normal range of motion.   Cardiovascular:      Rate and Rhythm: Normal rate.   Pulmonary:      Effort: Pulmonary effort is normal.   Lymphadenopathy:      Cervical: Cervical adenopathy present.   Skin:     General: Skin is warm and dry.   Neurological:      Mental Status: She is alert.         ED Course      Procedures        The medical record was reviewed and interpreted.  Current labs reviewed and interpreted.  Patient refused to take Augmentin pill here in the emergency department.     Results for orders placed or performed during the hospital encounter of 06/21/21   Symptomatic SARS-CoV-2 COVID-19 Virus (Coronavirus) by PCR     Status: None    Specimen:  Nasopharyngeal   Result Value Ref Range    SARS-CoV-2 Virus Specimen Source Nasopharyngeal     SARS-CoV-2 PCR Result NEGATIVE     SARS-CoV-2 PCR Comment (Note)    Streptococcus A Rapid Scr w Reflx to PCR     Status: None    Specimen: Throat   Result Value Ref Range    Strep Specimen Description Throat     Streptococcus Group A Rapid Screen Negative NEG^Negative   Group A Streptococcus PCR Throat Swab     Status: Abnormal    Specimen: Throat   Result Value Ref Range    Specimen Description Throat     Strep Group A PCR Detected, Abnormal Result (A) NDET^Not Detected     Medications - No data to display     Assessments & Plan (with Medical Decision Making)   30 year old female with 2-day history of right-sided sore throat.  She has soft tissue erythema and right tonsillar exudate without swelling or uvular deviation.  Differential diagnosis includes streptococcal pharyngitis, COVID-19, pretonsillar cellulitis.  No swelling to suggest peritonsillar abscess.    Strep screen was negative but PCR positive so infection consistent with streptococcal pharyngitis.  Covid negative.  Treatment attempted to be initiated here in the emergency department with Augmentin but patient refused to take the pill.  She will be discharged home on Augmentin suspension.  She should follow-up with ENT for recheck later this week.    I have reviewed the nursing notes. I have reviewed the findings, diagnosis, plan and need for follow up with the patient.    Discharge Medication List as of 6/21/2021  1:09 PM      START taking these medications    Details   amoxicillin-clavulanate (AUGMENTIN) 400-57 MG/5ML suspension Take 10.9 mLs (875 mg) by mouth 2 times daily for 10 days, Disp-220 mL, R-0, Local Print             Final diagnoses:   Peritonsillar cellulitis     Chart documentation was completed with Dragon voice-recognition software. Even though reviewed, this chart may still contain some grammatical, spelling, and word errors.     --  Cam  Simon Ventura  [facility] EMERGENCY DEPARTMENT  6/21/2021     Cam Montes MD  06/21/21 [id number]

## 2021-06-21 NOTE — ED TRIAGE NOTES
Patient presents today with right-sided sore throat pain that radiates to the right ear. Duration 2 days. Patient reports difficulty swallowing and swelling localized to the right side of throat.

## 2021-06-21 NOTE — DISCHARGE INSTRUCTIONS
Take complete course of Augmentin.  Continue to take acetaminophen as needed for pain.    Drink plenty of fluids.    Please make an appointment to follow up with [facility] (phone: [phone]) this week.    Return to the emergency department if worsening symptoms or other concerns.  If you are worse, you will likely need to be seen by an ENT doctor who works out of the [facility] and should go to the [facility] emergency department.

## 2021-06-24 NOTE — TELEPHONE ENCOUNTER
[facility] Emergency Department Lab result notification:    Reason for Letter being mailed out:      Patient treated in the Emergency Dept appropriate but they have NOT be notified about the Positive lab results.  Lab information to be reviewed with Patient or Parent        Unable to reach via telephone so letter sent with a message requesting a call back to [phone] between 9 a.m. and 5:30 p.m., 7 days a week for patient's ED/ lab results.   Lab result:  Group A Streptococcus PCR is POSITIVE.  [facility] Emergency Dept discharge antibiotic: amoxicillin-clavulanate (AUGMENTIN) 400-57 MG/5ML suspension, Take 10.9 mLs (875 mg) by mouth 2 times daily for 10 days  Recommendations in Treatment per [facility] ED Lab Result Strep group A protocol. .    Miscellaneous information: AMANDA Bruner RN  [facility] [email or web address] [place]  Emergency Dept Lab Result RN  # [phone]

## 2022-04-07 ENCOUNTER — APPOINTMENT (OUTPATIENT)
Dept: GENERAL RADIOLOGY | Facility: CLINIC | Age: 32
End: 2022-04-07
Attending: EMERGENCY MEDICINE
Payer: COMMERCIAL

## 2022-04-07 ENCOUNTER — HOSPITAL ENCOUNTER (EMERGENCY)
Facility: CLINIC | Age: 32
Discharge: HOME | End: 2022-04-08
Attending: EMERGENCY MEDICINE | Admitting: EMERGENCY MEDICINE
Payer: COMMERCIAL

## 2022-04-07 DIAGNOSIS — S93.401A SPRAIN OF RIGHT ANKLE, UNSPECIFIED LIGAMENT, INITIAL ENCOUNTER: ICD-10-CM

## 2022-04-07 PROCEDURE — 73630 X-RAY EXAM OF FOOT: CPT | Mod: RT

## 2022-04-07 PROCEDURE — 73610 X-RAY EXAM OF ANKLE: CPT | Mod: RT

## 2022-04-07 PROCEDURE — 250N000013 HC RX MED GY IP 250 OP 250 PS 637: Performed by: EMERGENCY MEDICINE

## 2022-04-07 PROCEDURE — 99282 EMERGENCY DEPT VISIT SF MDM: CPT | Performed by: EMERGENCY MEDICINE

## 2022-04-07 PROCEDURE — 99284 EMERGENCY DEPT VISIT MOD MDM: CPT

## 2022-04-07 RX ORDER — QUETIAPINE FUMARATE 100 MG/1
100 TABLET, FILM COATED ORAL AT BEDTIME
COMMUNITY

## 2022-04-07 RX ORDER — BUPRENORPHINE AND NALOXONE 8; 2 MG/1; MG/1
1 FILM, SOLUBLE BUCCAL; SUBLINGUAL 2 TIMES DAILY
COMMUNITY

## 2022-04-07 RX ORDER — ACETAMINOPHEN 500 MG
1000 TABLET ORAL ONCE
Status: COMPLETED | OUTPATIENT
Start: 2022-04-07 | End: 2022-04-07

## 2022-04-07 RX ADMIN — ACETAMINOPHEN 1000 MG: 500 TABLET ORAL at 22:35

## 2022-04-08 VITALS
RESPIRATION RATE: 16 BRPM | BODY MASS INDEX: 37.2 KG/M2 | WEIGHT: 210 LBS | SYSTOLIC BLOOD PRESSURE: 111 MMHG | OXYGEN SATURATION: 99 % | TEMPERATURE: 98.2 F | HEART RATE: 88 BPM | DIASTOLIC BLOOD PRESSURE: 79 MMHG

## 2022-04-08 NOTE — DISCHARGE INSTRUCTIONS
Please make an appointment to follow up with Your Primary Care Provider in 5-7 days.  Please return the emergency department if you have worsening pain, any numbness or weakness in the toes or foot, any other concerns.

## 2022-04-08 NOTE — ED PROVIDER NOTES
ED Provider Note  [facility]      History     Chief Complaint   Patient presents with     Ankle Pain     Patient was running, tripped and fell. Patient rolled her ankle while falling. Patient reports swelling with 8/10 stabbing constant pain that worsens with standing.     The history is provided by the patient.     Keyona Fisher is a 31 year old female with a past medical history significant for polysubstance abuse (IV heroin, meth, THC), depression, and PTSD who presents to the ED for evaluation of ankle and foot pain of the right foot.  She reports she was running on a sidewalk because she was late to a meeting and tripped and fell forward twisting her right ankle internally as she fell.  Patient believes that she stepped on uneven pavement causing the fall.  Patient was able to ambulate and bear weight on her right ankle for 2 more blocks but after she sat down for a while she could not bear weight any longer.  She states her lateral ankle and dorsum of her right foot started swelling after the fall.  Patient reports numbness on her medial right ankle.  She notes more pain on the right dorsum of her right foot radiating into her fourth and fifth right toes.  Patient endorses a tingling sensation in her right toes.  Patient denies injury to her knee or hip.  Patient did not hit her head and denies head, neck, or back injury.  Patient denies fever, cough, chest pain, shortness of breath.  No abdominal pain, nausea, vomiting.  Patient reports an allergy to ibuprofen.    Past Medical History  Past Medical History:   Diagnosis Date     Depression      Heroin use      PTSD (post-traumatic stress disorder)      Tobacco dependence      History reviewed. No pertinent surgical history.  Acetaminophen 325 MG CAPS  buprenorphine HCl-naloxone HCl (SUBOXONE) 8-2 MG per film  QUEtiapine (SEROQUEL) 100 MG tablet      Allergies   Allergen Reactions     Ibuprofen      Family History  Family History    Problem Relation Age of Onset     Diabetes Mother      Coronary Artery Disease Father      Diabetes Father      Coronary Artery Disease Maternal Grandmother      Coronary Artery Disease Maternal Grandfather      Social History   Social History     Tobacco Use     Smoking status: Current Every Day Smoker     Packs/day: 0.50     Types: Cigarettes     Smokeless tobacco: Never Used   Substance Use Topics     Alcohol use: Yes     Comment: sober 2 months     Drug use: Not Currently     Types: Marijuana     Comment: sober 2 months      Past medical history, past surgical history, medications, allergies, family history, and social history were reviewed with the patient. No additional pertinent items.       Review of Systems  A complete review of systems was performed with pertinent positives and negatives noted in the HPI, and all other systems negative.    Physical Exam   BP: 117/82  Pulse: 88  Temp: 98.3  F (36.8  C)  Resp: 18  Weight: 95.3 kg (210 lb)  SpO2: 98 %  Physical Exam  GEN:  Alert, well developed, no acute distress  HEENT:  PERRL, EOMI, Mucous membranes are moist.   Cardio:  RRR, no murmur, dorsalis pedis pulses equal bilaterally  PULM:  Lungs clear, good air movement, no wheezes, rales   Abd:  Soft, normal bowel sounds, no focal tenderness  Back exam:  No CVA tenderness, no C-spine, T-spine, L-spine tenderness.  Musculoskeletal: Right ankle has tenderness over the lateral malleolus, no tenderness over the distal fibula.  No tenderness over the medial malleolus.  There is normal sensation throughout the right ankle and right foot.  There is tenderness over the proximal fifth metatarsal as well swelling in this area.  No tenderness over the remaining metatarsals or over the toes.  There is no tenderness over the proximal fibula.  No calf swelling or tenderness  Neuro:  Alert and oriented X3, Follows commands, moving all extremities spontaneously.  Normal sensation and motor exam of the right foot.  Skin:   Warm, dry   ED Course     10:22 PM  The patient was seen and examined by Nuvia Robles MD in Room ED02.   Procedures       Patient was given Tylenol for pain  X-rays of the right ankle and right foot were reviewed by me and results are shown below.       Results for orders placed or performed during the hospital encounter of 04/07/22   Ankle XR, G/E 3 views, right     Status: None    Narrative    EXAM: XR ANKLE RIGHT G/E 3 VIEWS  LOCATION: [facility]  DATE/TIME: 4/7/2022 9:20 PM    INDICATION: swelling and pain to right ankle  COMPARISON: None.      Impression    IMPRESSION: The right ankle is negative for fracture. Ankle mortise is intact and symmetric.   Foot  XR, G/E 3 views, right     Status: None    Narrative    EXAM: XR FOOT RIGHT G/E 3 VIEWS  LOCATION: [facility]  DATE/TIME: 4/7/2022 11:42 PM    INDICATION: Pain after trauma.  COMPARISON: None.      Impression    IMPRESSION: Normal joint spaces and alignment. No fracture.     Medications   acetaminophen (TYLENOL) tablet 1,000 mg (1,000 mg Oral Given 4/7/22 2235)        Assessments & Plan (with Medical Decision Making)   Patient presents after mechanical fall with right foot and ankle pain.  Patient has pain over the lateral ankle and proximal fifth metatarsal, no fracture on x-ray.  She is unable to bear weight due to pain, so was given crutches and an ankle brace.  Patient was advised follow-up with her primary care physician within the next 5 to 7 days.  I advised that she return the emergency department if she has any associated numbness, weakness, worsening pain or other concerns.    I have reviewed the nursing notes. I have reviewed the findings, diagnosis, plan and need for follow up with the patient.    Discharge Medication List as of 4/8/2022 12:31 AM          Final diagnoses:   Sprain of right ankle, unspecified ligament, initial encounter       --  I,  Yolie Maldonado, am serving as a trained medical scribe to document services personally performed by Nuvia Robles MD, based on the provider's statements to me.     I, Nuvia Robles MD, was physically present and have reviewed and verified the accuracy of this note documented by Yolie Maldonado.    Nuvia Robles MD  [facility] EMERGENCY DEPARTMENT  4/7/2022     Nuvia Robles MD  04/08/22 0114

## 2022-06-13 ENCOUNTER — TRANSFERRED RECORDS (OUTPATIENT)
Dept: HEALTH INFORMATION MANAGEMENT | Facility: CLINIC | Age: 32
End: 2022-06-13

## 2022-10-20 ENCOUNTER — TRANSFERRED RECORDS (OUTPATIENT)
Dept: HEALTH INFORMATION MANAGEMENT | Facility: CLINIC | Age: 32
End: 2022-10-20

## 2022-10-20 ENCOUNTER — MEDICAL CORRESPONDENCE (OUTPATIENT)
Dept: HEALTH INFORMATION MANAGEMENT | Facility: CLINIC | Age: 32
End: 2022-10-20

## 2022-11-02 ENCOUNTER — TRANSCRIBE ORDERS (OUTPATIENT)
Dept: OTHER | Age: 32
End: 2022-11-02

## 2022-11-02 DIAGNOSIS — M54.50 LUMBAR BACK PAIN: Primary | ICD-10-CM

## 2022-11-07 ENCOUNTER — TRANSCRIBE ORDERS (OUTPATIENT)
Dept: MATERNAL FETAL MEDICINE | Facility: CLINIC | Age: 32
End: 2022-11-07

## 2022-11-07 DIAGNOSIS — O26.90 PREGNANCY RELATED CONDITION, ANTEPARTUM: Primary | ICD-10-CM

## 2022-11-16 ENCOUNTER — OFFICE VISIT (OUTPATIENT)
Dept: MATERNAL FETAL MEDICINE | Facility: CLINIC | Age: 32
End: 2022-11-16
Attending: OBSTETRICS & GYNECOLOGY
Payer: COMMERCIAL

## 2022-11-16 ENCOUNTER — HOSPITAL ENCOUNTER (OUTPATIENT)
Dept: ULTRASOUND IMAGING | Facility: CLINIC | Age: 32
Discharge: HOME | End: 2022-11-16
Attending: OBSTETRICS & GYNECOLOGY
Payer: COMMERCIAL

## 2022-11-16 ENCOUNTER — PRE VISIT (OUTPATIENT)
Dept: MATERNAL FETAL MEDICINE | Facility: CLINIC | Age: 32
End: 2022-11-16

## 2022-11-16 DIAGNOSIS — O99.210 OBESITY IN PREGNANCY: Primary | ICD-10-CM

## 2022-11-16 DIAGNOSIS — E66.812 OBESITY, CLASS II, BMI 35-39.9: ICD-10-CM

## 2022-11-16 DIAGNOSIS — F11.90 OPIOID USE DISORDER: ICD-10-CM

## 2022-11-16 DIAGNOSIS — O26.90 PREGNANCY RELATED CONDITION, ANTEPARTUM: ICD-10-CM

## 2022-11-16 PROCEDURE — 76811 OB US DETAILED SNGL FETUS: CPT

## 2022-11-16 PROCEDURE — 76811 OB US DETAILED SNGL FETUS: CPT | Mod: 26 | Performed by: STUDENT IN AN ORGANIZED HEALTH CARE EDUCATION/TRAINING PROGRAM

## 2022-11-16 NOTE — PROGRESS NOTES
Please see the full imaging report from the View[employer] program under the imaging tab.    Kelin Green MD  Maternal Fetal Medicine

## 2022-12-07 ENCOUNTER — HOSPITAL ENCOUNTER (OUTPATIENT)
Facility: CLINIC | Age: 32
Discharge: HOME | End: 2022-12-07
Attending: OBSTETRICS & GYNECOLOGY | Admitting: OBSTETRICS & GYNECOLOGY
Payer: COMMERCIAL

## 2022-12-07 VITALS — RESPIRATION RATE: 18 BRPM | TEMPERATURE: 98.8 F | SYSTOLIC BLOOD PRESSURE: 122 MMHG | DIASTOLIC BLOOD PRESSURE: 68 MMHG

## 2022-12-07 DIAGNOSIS — B96.89 BV (BACTERIAL VAGINOSIS): Primary | ICD-10-CM

## 2022-12-07 DIAGNOSIS — N76.0 BV (BACTERIAL VAGINOSIS): Primary | ICD-10-CM

## 2022-12-07 LAB
ABO/RH(D): NORMAL
ALBUMIN UR-MCNC: 10 MG/DL
ALT SERPL W P-5'-P-CCNC: 75 U/L (ref 0–50)
AMPHETAMINES UR QL SCN: NORMAL
ANTIBODY SCREEN: NEGATIVE
APPEARANCE UR: ABNORMAL
AST SERPL W P-5'-P-CCNC: 30 U/L (ref 0–45)
BILIRUB UR QL STRIP: NEGATIVE
CANNABINOIDS UR QL SCN: NORMAL
CLUE CELLS: PRESENT
COCAINE UR QL: NORMAL
COLOR UR AUTO: YELLOW
CREAT SERPL-MCNC: 0.49 MG/DL (ref 0.52–1.04)
CREAT UR-MCNC: 115 MG/DL
ERYTHROCYTE [DISTWIDTH] IN BLOOD BY AUTOMATED COUNT: 13 % (ref 10–15)
GFR SERPL CREATININE-BSD FRML MDRD: >90 ML/MIN/1.73M2
GLUCOSE BLDC GLUCOMTR-MCNC: 95 MG/DL (ref 70–99)
GLUCOSE UR STRIP-MCNC: NEGATIVE MG/DL
HCT VFR BLD AUTO: 34.9 % (ref 35–47)
HGB BLD-MCNC: 12.2 G/DL (ref 11.7–15.7)
HGB UR QL STRIP: NEGATIVE
KETONES UR STRIP-MCNC: NEGATIVE MG/DL
LEUKOCYTE ESTERASE UR QL STRIP: NEGATIVE
MCH RBC QN AUTO: 30.1 PG (ref 26.5–33)
MCHC RBC AUTO-ENTMCNC: 35 G/DL (ref 31.5–36.5)
MCV RBC AUTO: 86 FL (ref 78–100)
MUCOUS THREADS #/AREA URNS LPF: PRESENT /LPF
NITRATE UR QL: NEGATIVE
OPIATES UR QL SCN: NORMAL
PCP UR QL SCN: NORMAL
PH UR STRIP: 6 [PH] (ref 5–7)
PLATELET # BLD AUTO: 232 10E3/UL (ref 150–450)
PROT UR-MCNC: 0.28 G/L
PROT/CREAT 24H UR: 0.24 G/G CR (ref 0–0.2)
RBC # BLD AUTO: 4.05 10E6/UL (ref 3.8–5.2)
RBC URINE: 2 /HPF
SP GR UR STRIP: 1.02 (ref 1–1.03)
SPECIMEN EXPIRATION DATE: NORMAL
SQUAMOUS EPITHELIAL: 19 /HPF
TRANSITIONAL EPI: <1 /HPF
TRICHOMONAS, WET PREP: ABNORMAL
UROBILINOGEN UR STRIP-MCNC: NORMAL MG/DL
WBC # BLD AUTO: 7.7 10E3/UL (ref 4–11)
WBC URINE: 3 /HPF
WBC'S/HIGH POWER FIELD, WET PREP: ABNORMAL
YEAST, WET PREP: ABNORMAL

## 2022-12-07 PROCEDURE — 86850 RBC ANTIBODY SCREEN: CPT

## 2022-12-07 PROCEDURE — 84460 ALANINE AMINO (ALT) (SGPT): CPT

## 2022-12-07 PROCEDURE — 87653 STREP B DNA AMP PROBE: CPT

## 2022-12-07 PROCEDURE — 87491 CHLMYD TRACH DNA AMP PROBE: CPT

## 2022-12-07 PROCEDURE — 84156 ASSAY OF PROTEIN URINE: CPT

## 2022-12-07 PROCEDURE — 250N000013 HC RX MED GY IP 250 OP 250 PS 637

## 2022-12-07 PROCEDURE — 82565 ASSAY OF CREATININE: CPT

## 2022-12-07 PROCEDURE — 82962 GLUCOSE BLOOD TEST: CPT

## 2022-12-07 PROCEDURE — 87210 SMEAR WET MOUNT SALINE/INK: CPT

## 2022-12-07 PROCEDURE — 36415 COLL VENOUS BLD VENIPUNCTURE: CPT

## 2022-12-07 PROCEDURE — 87591 N.GONORRHOEAE DNA AMP PROB: CPT

## 2022-12-07 PROCEDURE — G0463 HOSPITAL OUTPT CLINIC VISIT: HCPCS | Mod: 25

## 2022-12-07 PROCEDURE — 999N000104 HC STATISTIC NO CHARGE

## 2022-12-07 PROCEDURE — 80307 DRUG TEST PRSMV CHEM ANLYZR: CPT

## 2022-12-07 PROCEDURE — 59025 FETAL NON-STRESS TEST: CPT

## 2022-12-07 PROCEDURE — 85027 COMPLETE CBC AUTOMATED: CPT

## 2022-12-07 PROCEDURE — 87086 URINE CULTURE/COLONY COUNT: CPT

## 2022-12-07 PROCEDURE — 86901 BLOOD TYPING SEROLOGIC RH(D): CPT

## 2022-12-07 PROCEDURE — 84450 TRANSFERASE (AST) (SGOT): CPT

## 2022-12-07 PROCEDURE — 81001 URINALYSIS AUTO W/SCOPE: CPT

## 2022-12-07 RX ORDER — METOCLOPRAMIDE 10 MG/1
10 TABLET ORAL EVERY 6 HOURS PRN
Status: DISCONTINUED | OUTPATIENT
Start: 2022-12-07 | End: 2022-12-07 | Stop reason: HOSPADM

## 2022-12-07 RX ORDER — DIPHENHYDRAMINE HCL 25 MG
25 CAPSULE ORAL ONCE
Status: COMPLETED | OUTPATIENT
Start: 2022-12-07 | End: 2022-12-07

## 2022-12-07 RX ORDER — METOCLOPRAMIDE HYDROCHLORIDE 5 MG/ML
10 INJECTION INTRAMUSCULAR; INTRAVENOUS EVERY 6 HOURS PRN
Status: DISCONTINUED | OUTPATIENT
Start: 2022-12-07 | End: 2022-12-07 | Stop reason: HOSPADM

## 2022-12-07 RX ORDER — ONDANSETRON 4 MG/1
4 TABLET, ORALLY DISINTEGRATING ORAL EVERY 6 HOURS PRN
Status: DISCONTINUED | OUTPATIENT
Start: 2022-12-07 | End: 2022-12-07 | Stop reason: HOSPADM

## 2022-12-07 RX ORDER — PROCHLORPERAZINE 25 MG
25 SUPPOSITORY, RECTAL RECTAL EVERY 12 HOURS PRN
Status: DISCONTINUED | OUTPATIENT
Start: 2022-12-07 | End: 2022-12-07 | Stop reason: HOSPADM

## 2022-12-07 RX ORDER — PROCHLORPERAZINE MALEATE 10 MG
10 TABLET ORAL EVERY 6 HOURS PRN
Status: DISCONTINUED | OUTPATIENT
Start: 2022-12-07 | End: 2022-12-07 | Stop reason: HOSPADM

## 2022-12-07 RX ORDER — METOCLOPRAMIDE 10 MG/1
10 TABLET ORAL ONCE
Status: COMPLETED | OUTPATIENT
Start: 2022-12-07 | End: 2022-12-07

## 2022-12-07 RX ORDER — ONDANSETRON 2 MG/ML
4 INJECTION INTRAMUSCULAR; INTRAVENOUS EVERY 6 HOURS PRN
Status: DISCONTINUED | OUTPATIENT
Start: 2022-12-07 | End: 2022-12-07 | Stop reason: HOSPADM

## 2022-12-07 RX ORDER — SODIUM CHLORIDE, SODIUM LACTATE, POTASSIUM CHLORIDE, CALCIUM CHLORIDE 600; 310; 30; 20 MG/100ML; MG/100ML; MG/100ML; MG/100ML
INJECTION, SOLUTION INTRAVENOUS CONTINUOUS
Status: DISCONTINUED | OUTPATIENT
Start: 2022-12-07 | End: 2022-12-07 | Stop reason: HOSPADM

## 2022-12-07 RX ORDER — METRONIDAZOLE 7.5 MG/G
1 GEL VAGINAL DAILY
Qty: 25 G | Refills: 0 | Status: SHIPPED | OUTPATIENT
Start: 2022-12-07 | End: 2022-12-12

## 2022-12-07 RX ORDER — PRENATAL VIT/IRON FUM/FOLIC AC 27MG-0.8MG
1 TABLET ORAL DAILY
COMMUNITY

## 2022-12-07 RX ADMIN — METOCLOPRAMIDE 10 MG: 10 TABLET ORAL at 12:39

## 2022-12-07 RX ADMIN — DIPHENHYDRAMINE HYDROCHLORIDE 25 MG: 25 CAPSULE ORAL at 12:39

## 2022-12-07 ASSESSMENT — ACTIVITIES OF DAILY LIVING (ADL)
ADLS_ACUITY_SCORE: 35
ADLS_ACUITY_SCORE: 18
ADLS_ACUITY_SCORE: 18

## 2022-12-07 NOTE — PLAN OF CARE
"Pt arrived for evaluation of  contractions. Also reports feeling dizzy and \"no right\" today. Denies LOF, VB. VSS. EFM/toco applied. Triage assessment completed. MD WALE Arreguin to bedside to evaluate. SSE/SVE were performed. 3. Bedside glucose to be performed, pt orally hydrating and regular diet per MD. Plan to recheck cervix in 2 hours.     "

## 2022-12-07 NOTE — PLAN OF CARE
MD WALE Arreguin updated that pt headache and dizziness resolved. Reports contractions continue at the same frequency and continue to be uncomfortable and crampy. MD updated on labs. Plan for recheck of cervix shortly.

## 2022-12-07 NOTE — ED NOTES
Pt arrived to ED with complaint of  Labor Pains  Pt reports 36 weeks pregnant.   Pt is having contractions Yes.   Pt feels urge to push No.   Pt reports water broke No.   Report was called and pt was transferred to L&D Yes.

## 2022-12-07 NOTE — DISCHARGE INSTRUCTIONS
Discharge Instruction for Undelivered Patients      You were seen for: Pre-term labor evaluation, headache and dizziness  We Consulted: Dr. Lam   You had (Test or Medicine): Pre-term labor evaluation     Diet:   Drink 8 to 12 glasses of liquids (milk, juice, water) every day.  You may eat meals and snacks.     Activity:  Call your doctor or nurse midwife if your baby is moving less than usual.     Call your provider if you notice:  Swelling in your face or increased swelling in your hands or legs.  Headaches that are not relieved by Tylenol (acetaminophen).  Changes in your vision (blurring: seeing spots or stars.)  Nausea (sick to your stomach) and vomiting (throwing up).   Weight gain of 5 pounds or more per week.  Heartburn that doesn't go away.  Signs of bladder infection: pain when you urinate (use the toilet), need to go more often and more urgently.  The bag of davalos (rupture of membranes) breaks, or you notice leaking in your underwear.  Bright red blood in your underwear.  Abdominal (lower belly) or stomach pain.  For first baby: Contractions (tightening) less than 5 minutes apart for one hour or more.  Second (plus) baby: Contractions (tightening) less than 10 minutes apart and getting stronger.  *If less than 34 weeks: Contractions (tightening) more than 6 times in one hour.  Increase or change in vaginal discharge (note the color and amount)      Follow-up:  As scheduled in the clinic

## 2022-12-07 NOTE — PROGRESS NOTES
L&D Triage Progress Note     HPI: Keyona Fisher is a 32 year old  at 35w4d by LMP c/w 9w2d US, here for contractions and headache.     Pregnancy notable for:  - Elev GCT, no GTT  - POC glucose 95  - KAYKAY on suboxone  - TUD, NRT PRN  - MDD/PTSD  - Obesity    She notes contractions that started at 9 am and have been persistent and becoming more intense since that time. Note pelvic pressure. No new vaginal discharge.  + FM, no VB, or LOF.  She also has a headache and feels dizzy intermittently. Eating and drinking okay but hasn't had much yet today.     She denies fever, chills, scotoma, CP, SOB, nausea, vomiting,  RUQ pain, constipation, diarrhea, dysuria, and acute swelling.    Denies issues with HTN, asthma. Notes elevated GCT and unable to complete GTT yet.    Four prior vaginal delivers w/o complications. No h/o PTL/PTD.    Lab Results   Component Value Date    ABO O 2018    RH Pos 2018    AS Neg 2018    CHPCRT  2010     Negative for C. trachomatis rRNA by transcription mediated amplification.    GCPCRT  2010     Negative for N. gonorrhoeae rRNA by transcription mediated amplification.    TREPAB Negative 2018    HGB 12.2 2021    HIV Negative 2008       GBS Status:   Lab Results   Component Value Date    GBS Negative 2018             OBHX:  OB History    Para Term  AB Living   6 4 4 0 1 4   SAB IAB Ectopic Multiple Live Births   0 0 0 0 4      # Outcome Date GA Lbr [place]/2nd Weight Sex Delivery Anes PTL Lv   6 Current            5 Term 18 38w3d 03:05 / 00:03 3.02 kg (6 lb 10.5 oz) M Vag-Spont EPI N OLE      Name: MITZI FISHER      Apgar1: 9  Apgar5: 9   4 AB            3 Term         OLE   2 Term         OLE   1 Term         OLE       MedicalHX:  Past Medical History:   Diagnosis Date    Depression     Heroin use     PTSD (post-traumatic stress disorder)     Tobacco dependence        SurgicalHX:  History reviewed. No pertinent  surgical history.    Medications:  No current facility-administered medications on file prior to encounter.  Acetaminophen 325 MG CAPS, Take 325-650 mg by mouth every 4 hours as needed  buprenorphine HCl-naloxone HCl (SUBOXONE) 8-2 MG per film, Place 1 Film under the tongue 2 times daily  Prenatal Vit-Fe Fumarate-FA (PRENATAL MULTIVITAMIN W/IRON) 27-0.8 MG tablet, Take 1 tablet by mouth daily  QUEtiapine (SEROQUEL) 100 MG tablet, Take 100 mg by mouth At Bedtime        Allergies:  Allergies   Allergen Reactions    Ibuprofen        FamilyHX:      Family History   Problem Relation Age of Onset    Diabetes Mother     Coronary Artery Disease Father     Diabetes Father     Coronary Artery Disease Maternal Grandmother     Coronary Artery Disease Maternal Grandfather        SocialHX:  Social History     Socioeconomic History    Marital status: Single     Spouse name: None    Number of children: None    Years of education: None    Highest education level: None   Tobacco Use    Smoking status: Every Day     Packs/day: 0.25     Types: Cigarettes    Smokeless tobacco: Never   Substance and Sexual Activity    Alcohol use: Yes     Comment: sober 2 months    Drug use: Not Currently     Comment: sober 2 months    Sexual activity: Yes     Partners: Male     Birth control/protection: Implant, None       ROS: 10-point ROS negative except as in HPI.    Physical Exam:  Vitals:    12/07/22 1124 12/07/22 1125   BP:  112/59   Resp: 18    Temp: 98.8  F (37.1  C)    TempSrc: Oral      GEN: resting comfortably in bed, NAD   CV: Regular rate, well perfused  PULM: On room air, no increased work of breathing  ABD: soft, gravid, non-tender, non-distended  EXT: No edema, non tender to palpation    SSE: Normal appearing external female genitalia. Normal appearing vaginal mucosa and cervix without lesions. Minimal amounts of vaginal discharge throughout the vaginal vault. No pooling.  SVE: 1/30/-3, posterior, medium at 1220; unchanged at  1519    Presentation: cephalic by BSUS.  EFW: 6 lbs    NST:  FHT: baseline 120, mod variability, + accels, - decels  TOCO: 2 ctx in ten minutes    A/P: 32 year old  at 35w4d by LMP c/w 9w2d US, here for r/o PTL and headache.    # Rule out  Labor   - Cervix: /3, recheck at three hours unchanged  - Membranes: Intact  - Labs: UA reflex UC, GC/C, Wet Prep, GBS   - Wet prep with clue cells and exam c/w BV: agrees to tx with vaginal metronidazole x5d, ordered.  - Oral Hydration  - Consider other sources for patient's contractions/cramping/abdominal pain    # Headache  # Dizziness  - S/p Tylenol at home w/o improvement. Reglan/benadryl given with relief.  - Normotensive  - HELLP labs with ALT mildly elevated to 75 (improved from 2021), otherwise wnl  - Random glucose 95    # Fetal Well-Being  - Continuous Monitoring   - FHT: Category I    # PNC  - Rh positive, GBS collected today  - GCT elevated per pt report, no GTT yet. Random glucose today 95  - Imaging: EFW 61%, anterior placenta w/o previa, nl fluid. Cephalic by BSUS today.    Dispo: Discharge to home. Return precautions discussed.    Patient evaluated with Dr. Lam.    Heri Arreguin MD, MPH  Ob/Gyn Resident, PGY-2  22 6:28 PM     Staff MD Note    I appreciate the note by Dr. Arreguin.  Any necessary changes have been made by me.  I saw and evaluated the patient and agree with the findings and plan of care as documented in the note.    Vanessa Lam MD

## 2022-12-08 LAB
C TRACH DNA SPEC QL NAA+PROBE: NEGATIVE
GP B STREP DNA SPEC QL NAA+PROBE: NEGATIVE
N GONORRHOEA DNA SPEC QL NAA+PROBE: NEGATIVE

## 2022-12-09 LAB — BACTERIA UR CULT: NORMAL

## 2022-12-28 ENCOUNTER — HOSPITAL ENCOUNTER (OUTPATIENT)
Dept: ULTRASOUND IMAGING | Facility: CLINIC | Age: 32
Discharge: HOME | End: 2022-12-28
Attending: STUDENT IN AN ORGANIZED HEALTH CARE EDUCATION/TRAINING PROGRAM
Payer: COMMERCIAL

## 2022-12-28 ENCOUNTER — OFFICE VISIT (OUTPATIENT)
Dept: MATERNAL FETAL MEDICINE | Facility: CLINIC | Age: 32
End: 2022-12-28
Attending: STUDENT IN AN ORGANIZED HEALTH CARE EDUCATION/TRAINING PROGRAM
Payer: COMMERCIAL

## 2022-12-28 DIAGNOSIS — O26.90 PREGNANCY RELATED CONDITION, ANTEPARTUM: Primary | ICD-10-CM

## 2022-12-28 DIAGNOSIS — F11.90 OPIOID USE DISORDER: ICD-10-CM

## 2022-12-28 DIAGNOSIS — O99.210 OBESITY IN PREGNANCY: ICD-10-CM

## 2022-12-28 PROCEDURE — 76816 OB US FOLLOW-UP PER FETUS: CPT

## 2022-12-28 PROCEDURE — 76816 OB US FOLLOW-UP PER FETUS: CPT | Mod: 26 | Performed by: OBSTETRICS & GYNECOLOGY

## 2022-12-28 PROCEDURE — 76819 FETAL BIOPHYS PROFIL W/O NST: CPT | Mod: 26 | Performed by: OBSTETRICS & GYNECOLOGY

## 2022-12-28 PROCEDURE — 76819 FETAL BIOPHYS PROFIL W/O NST: CPT

## 2022-12-28 NOTE — PROGRESS NOTES
"Please see \"Imaging\" tab under \"Chart Review\" for details of today's US.    Charis Carmen, DO    "

## 2022-12-31 ENCOUNTER — NURSE TRIAGE (OUTPATIENT)
Dept: NURSING | Facility: CLINIC | Age: 32
End: 2022-12-31

## 2023-01-01 NOTE — TELEPHONE ENCOUNTER
Reason for Disposition    General information question, no triage required and triager able to answer question    Additional Information    Negative: [1] Caller is not with the adult (patient) AND [2] reporting urgent symptoms    Negative: Lab result questions    Negative: Medication questions    Negative: Caller can't be reached by phone    Negative: Caller has already spoken to PCP or another triager    Negative: RN needs further essential information from caller in order to complete triage    Negative: Requesting regular office appointment    Negative: [1] Caller requesting NON-URGENT health information AND [2] PCP's office is the best resource    Protocols used: INFORMATION ONLY CALL - NO TRIAGE-A-

## 2023-01-06 ENCOUNTER — ANESTHESIA EVENT (OUTPATIENT)
Dept: OBGYN | Facility: CLINIC | Age: 33
End: 2023-01-06
Payer: COMMERCIAL

## 2023-01-06 ENCOUNTER — ANESTHESIA (OUTPATIENT)
Dept: OBGYN | Facility: CLINIC | Age: 33
End: 2023-01-06
Payer: COMMERCIAL

## 2023-01-06 ENCOUNTER — HOSPITAL ENCOUNTER (INPATIENT)
Facility: CLINIC | Age: 33
LOS: 2 days | Discharge: HOME HEALTH | End: 2023-01-08
Attending: OBSTETRICS & GYNECOLOGY | Admitting: OBSTETRICS & GYNECOLOGY
Payer: COMMERCIAL

## 2023-01-06 DIAGNOSIS — B19.20 HEPATITIS C VIRUS INFECTION, UNSPECIFIED CHRONICITY: ICD-10-CM

## 2023-01-06 DIAGNOSIS — Z37.9 NORMAL LABOR: ICD-10-CM

## 2023-01-06 LAB
ABO/RH(D): NORMAL
ALBUMIN SERPL BCG-MCNC: 3.5 G/DL (ref 3.5–5.2)
ALP SERPL-CCNC: 144 U/L (ref 35–104)
ALT SERPL W P-5'-P-CCNC: 86 U/L (ref 10–35)
AMPHETAMINES UR QL SCN: NORMAL
ANTIBODY SCREEN: NEGATIVE
AST SERPL W P-5'-P-CCNC: 51 U/L (ref 10–35)
BILIRUB DIRECT SERPL-MCNC: 0.3 MG/DL (ref 0–0.3)
BILIRUB SERPL-MCNC: 0.6 MG/DL
CANNABINOIDS UR QL SCN: NORMAL
COCAINE UR QL: NORMAL
ERYTHROCYTE [DISTWIDTH] IN BLOOD BY AUTOMATED COUNT: 13.1 % (ref 10–15)
GLUCOSE BLDC GLUCOMTR-MCNC: 117 MG/DL (ref 70–99)
GLUCOSE BLDC GLUCOMTR-MCNC: 81 MG/DL (ref 70–99)
GLUCOSE BLDC GLUCOMTR-MCNC: 91 MG/DL (ref 70–99)
GLUCOSE BLDC GLUCOMTR-MCNC: 92 MG/DL (ref 70–99)
HBA1C MFR BLD: 4.9 % (ref 0–5.6)
HCT VFR BLD AUTO: 37.8 % (ref 35–47)
HGB BLD-MCNC: 13.2 G/DL (ref 11.7–15.7)
MCH RBC QN AUTO: 29.9 PG (ref 26.5–33)
MCHC RBC AUTO-ENTMCNC: 34.9 G/DL (ref 31.5–36.5)
MCV RBC AUTO: 86 FL (ref 78–100)
OPIATES UR QL SCN: NORMAL
PCP UR QL SCN: NORMAL
PLATELET # BLD AUTO: 260 10E3/UL (ref 150–450)
PROT SERPL-MCNC: 6.3 G/DL (ref 6.4–8.3)
RBC # BLD AUTO: 4.41 10E6/UL (ref 3.8–5.2)
SARS-COV-2 RNA RESP QL NAA+PROBE: NEGATIVE
SPECIMEN EXPIRATION DATE: NORMAL
T PALLIDUM AB SER QL: REACTIVE
WBC # BLD AUTO: 9.4 10E3/UL (ref 4–11)

## 2023-01-06 PROCEDURE — 250N000011 HC RX IP 250 OP 636

## 2023-01-06 PROCEDURE — 250N000013 HC RX MED GY IP 250 OP 250 PS 637: Performed by: STUDENT IN AN ORGANIZED HEALTH CARE EDUCATION/TRAINING PROGRAM

## 2023-01-06 PROCEDURE — 86593 SYPHILIS TEST NON-TREP QUANT: CPT

## 2023-01-06 PROCEDURE — 258N000003 HC RX IP 258 OP 636

## 2023-01-06 PROCEDURE — 86901 BLOOD TYPING SEROLOGIC RH(D): CPT

## 2023-01-06 PROCEDURE — 85027 COMPLETE CBC AUTOMATED: CPT

## 2023-01-06 PROCEDURE — 250N000009 HC RX 250

## 2023-01-06 PROCEDURE — 250N000013 HC RX MED GY IP 250 OP 250 PS 637

## 2023-01-06 PROCEDURE — 370N000003 HC ANESTHESIA WARD SERVICE

## 2023-01-06 PROCEDURE — 86592 SYPHILIS TEST NON-TREP QUAL: CPT

## 2023-01-06 PROCEDURE — 722N000001 HC LABOR CARE VAGINAL DELIVERY SINGLE

## 2023-01-06 PROCEDURE — 250N000011 HC RX IP 250 OP 636: Performed by: STUDENT IN AN ORGANIZED HEALTH CARE EDUCATION/TRAINING PROGRAM

## 2023-01-06 PROCEDURE — 258N000003 HC RX IP 258 OP 636: Performed by: STUDENT IN AN ORGANIZED HEALTH CARE EDUCATION/TRAINING PROGRAM

## 2023-01-06 PROCEDURE — U0003 INFECTIOUS AGENT DETECTION BY NUCLEIC ACID (DNA OR RNA); SEVERE ACUTE RESPIRATORY SYNDROME CORONAVIRUS 2 (SARS-COV-2) (CORONAVIRUS DISEASE [COVID-19]), AMPLIFIED PROBE TECHNIQUE, MAKING USE OF HIGH THROUGHPUT TECHNOLOGIES AS DESCRIBED BY CMS-2020-01-R: HCPCS | Performed by: OBSTETRICS & GYNECOLOGY

## 2023-01-06 PROCEDURE — 10907ZC DRAINAGE OF AMNIOTIC FLUID, THERAPEUTIC FROM PRODUCTS OF CONCEPTION, VIA NATURAL OR ARTIFICIAL OPENING: ICD-10-PCS | Performed by: OBSTETRICS & GYNECOLOGY

## 2023-01-06 PROCEDURE — 120N000002 HC R&B MED SURG/OB UMMC

## 2023-01-06 PROCEDURE — 83036 HEMOGLOBIN GLYCOSYLATED A1C: CPT

## 2023-01-06 PROCEDURE — 82962 GLUCOSE BLOOD TEST: CPT

## 2023-01-06 PROCEDURE — 86780 TREPONEMA PALLIDUM: CPT

## 2023-01-06 PROCEDURE — 3E0R3BZ INTRODUCTION OF ANESTHETIC AGENT INTO SPINAL CANAL, PERCUTANEOUS APPROACH: ICD-10-PCS | Performed by: ANESTHESIOLOGY

## 2023-01-06 PROCEDURE — 00HU33Z INSERTION OF INFUSION DEVICE INTO SPINAL CANAL, PERCUTANEOUS APPROACH: ICD-10-PCS | Performed by: ANESTHESIOLOGY

## 2023-01-06 PROCEDURE — 87522 HEPATITIS C REVRS TRNSCRPJ: CPT

## 2023-01-06 PROCEDURE — 59409 OBSTETRICAL CARE: CPT | Mod: GC | Performed by: OBSTETRICS & GYNECOLOGY

## 2023-01-06 PROCEDURE — 3E033VJ INTRODUCTION OF OTHER HORMONE INTO PERIPHERAL VEIN, PERCUTANEOUS APPROACH: ICD-10-PCS | Performed by: OBSTETRICS & GYNECOLOGY

## 2023-01-06 PROCEDURE — 80307 DRUG TEST PRSMV CHEM ANLYZR: CPT

## 2023-01-06 PROCEDURE — 84450 TRANSFERASE (AST) (SGOT): CPT | Performed by: STUDENT IN AN ORGANIZED HEALTH CARE EDUCATION/TRAINING PROGRAM

## 2023-01-06 RX ORDER — NALOXONE HYDROCHLORIDE 0.4 MG/ML
0.2 INJECTION, SOLUTION INTRAMUSCULAR; INTRAVENOUS; SUBCUTANEOUS
Status: DISCONTINUED | OUTPATIENT
Start: 2023-01-06 | End: 2023-01-06

## 2023-01-06 RX ORDER — SODIUM CHLORIDE 9 MG/ML
INJECTION, SOLUTION INTRAVENOUS
Status: DISCONTINUED
Start: 2023-01-06 | End: 2023-01-07 | Stop reason: HOSPADM

## 2023-01-06 RX ORDER — TRANEXAMIC ACID 10 MG/ML
1 INJECTION, SOLUTION INTRAVENOUS EVERY 30 MIN PRN
Status: DISCONTINUED | OUTPATIENT
Start: 2023-01-06 | End: 2023-01-07

## 2023-01-06 RX ORDER — NICOTINE POLACRILEX 4 MG
15-30 LOZENGE BUCCAL
Status: DISCONTINUED | OUTPATIENT
Start: 2023-01-06 | End: 2023-01-07

## 2023-01-06 RX ORDER — SODIUM CHLORIDE, SODIUM LACTATE, POTASSIUM CHLORIDE, CALCIUM CHLORIDE 600; 310; 30; 20 MG/100ML; MG/100ML; MG/100ML; MG/100ML
INJECTION, SOLUTION INTRAVENOUS
Status: DISCONTINUED
Start: 2023-01-06 | End: 2023-01-07 | Stop reason: HOSPADM

## 2023-01-06 RX ORDER — OXYTOCIN 10 [USP'U]/ML
10 INJECTION, SOLUTION INTRAMUSCULAR; INTRAVENOUS
Status: DISCONTINUED | OUTPATIENT
Start: 2023-01-06 | End: 2023-01-07

## 2023-01-06 RX ORDER — OXYTOCIN/0.9 % SODIUM CHLORIDE 30/500 ML
340 PLASTIC BAG, INJECTION (ML) INTRAVENOUS CONTINUOUS PRN
Status: DISCONTINUED | OUTPATIENT
Start: 2023-01-06 | End: 2023-01-08 | Stop reason: HOSPADM

## 2023-01-06 RX ORDER — OXYTOCIN/0.9 % SODIUM CHLORIDE 30/500 ML
340 PLASTIC BAG, INJECTION (ML) INTRAVENOUS CONTINUOUS PRN
Status: DISCONTINUED | OUTPATIENT
Start: 2023-01-06 | End: 2023-01-07

## 2023-01-06 RX ORDER — MISOPROSTOL 200 UG/1
800 TABLET ORAL
Status: DISCONTINUED | OUTPATIENT
Start: 2023-01-06 | End: 2023-01-08 | Stop reason: HOSPADM

## 2023-01-06 RX ORDER — OXYTOCIN/0.9 % SODIUM CHLORIDE 30/500 ML
100-340 PLASTIC BAG, INJECTION (ML) INTRAVENOUS CONTINUOUS PRN
Status: DISCONTINUED | OUTPATIENT
Start: 2023-01-06 | End: 2023-01-07

## 2023-01-06 RX ORDER — MODIFIED LANOLIN
OINTMENT (GRAM) TOPICAL
Status: DISCONTINUED | OUTPATIENT
Start: 2023-01-06 | End: 2023-01-08 | Stop reason: HOSPADM

## 2023-01-06 RX ORDER — TERBUTALINE SULFATE 1 MG/ML
0.25 INJECTION, SOLUTION SUBCUTANEOUS
Status: DISCONTINUED | OUTPATIENT
Start: 2023-01-06 | End: 2023-01-07

## 2023-01-06 RX ORDER — MISOPROSTOL 200 UG/1
400 TABLET ORAL
Status: DISCONTINUED | OUTPATIENT
Start: 2023-01-06 | End: 2023-01-08 | Stop reason: HOSPADM

## 2023-01-06 RX ORDER — FENTANYL/ROPIVACAINE/NS/PF 2MCG/ML-.1
PLASTIC BAG, INJECTION (ML) EPIDURAL
Status: DISCONTINUED | OUTPATIENT
Start: 2023-01-06 | End: 2023-01-07

## 2023-01-06 RX ORDER — METOCLOPRAMIDE 10 MG/1
10 TABLET ORAL EVERY 6 HOURS PRN
Status: DISCONTINUED | OUTPATIENT
Start: 2023-01-06 | End: 2023-01-07

## 2023-01-06 RX ORDER — OXYTOCIN 10 [USP'U]/ML
10 INJECTION, SOLUTION INTRAMUSCULAR; INTRAVENOUS
Status: DISCONTINUED | OUTPATIENT
Start: 2023-01-06 | End: 2023-01-08 | Stop reason: HOSPADM

## 2023-01-06 RX ORDER — SODIUM CHLORIDE 9 MG/ML
INJECTION, SOLUTION INTRAVENOUS CONTINUOUS
Status: DISCONTINUED | OUTPATIENT
Start: 2023-01-06 | End: 2023-01-07

## 2023-01-06 RX ORDER — ACETAMINOPHEN 325 MG/1
650 TABLET ORAL ONCE
Status: COMPLETED | OUTPATIENT
Start: 2023-01-06 | End: 2023-01-06

## 2023-01-06 RX ORDER — CITRIC ACID/SODIUM CITRATE 334-500MG
30 SOLUTION, ORAL ORAL
Status: DISCONTINUED | OUTPATIENT
Start: 2023-01-06 | End: 2023-01-07

## 2023-01-06 RX ORDER — DEXTROSE MONOHYDRATE 25 G/50ML
25-50 INJECTION, SOLUTION INTRAVENOUS
Status: DISCONTINUED | OUTPATIENT
Start: 2023-01-06 | End: 2023-01-07

## 2023-01-06 RX ORDER — DOCUSATE SODIUM 100 MG/1
100 CAPSULE, LIQUID FILLED ORAL DAILY
Status: DISCONTINUED | OUTPATIENT
Start: 2023-01-07 | End: 2023-01-08 | Stop reason: HOSPADM

## 2023-01-06 RX ORDER — PROCHLORPERAZINE MALEATE 10 MG
10 TABLET ORAL EVERY 6 HOURS PRN
Status: DISCONTINUED | OUTPATIENT
Start: 2023-01-06 | End: 2023-01-07

## 2023-01-06 RX ORDER — MISOPROSTOL 200 UG/1
400 TABLET ORAL
Status: DISCONTINUED | OUTPATIENT
Start: 2023-01-06 | End: 2023-01-07

## 2023-01-06 RX ORDER — NALBUPHINE HYDROCHLORIDE 10 MG/ML
2.5-5 INJECTION, SOLUTION INTRAMUSCULAR; INTRAVENOUS; SUBCUTANEOUS EVERY 6 HOURS PRN
Status: DISCONTINUED | OUTPATIENT
Start: 2023-01-06 | End: 2023-01-08 | Stop reason: HOSPADM

## 2023-01-06 RX ORDER — MISOPROSTOL 200 UG/1
800 TABLET ORAL
Status: DISCONTINUED | OUTPATIENT
Start: 2023-01-06 | End: 2023-01-07

## 2023-01-06 RX ORDER — NALOXONE HYDROCHLORIDE 0.4 MG/ML
0.2 INJECTION, SOLUTION INTRAMUSCULAR; INTRAVENOUS; SUBCUTANEOUS
Status: DISCONTINUED | OUTPATIENT
Start: 2023-01-06 | End: 2023-01-07

## 2023-01-06 RX ORDER — SODIUM CHLORIDE, SODIUM LACTATE, POTASSIUM CHLORIDE, CALCIUM CHLORIDE 600; 310; 30; 20 MG/100ML; MG/100ML; MG/100ML; MG/100ML
INJECTION, SOLUTION INTRAVENOUS CONTINUOUS PRN
Status: DISCONTINUED | OUTPATIENT
Start: 2023-01-06 | End: 2023-01-07

## 2023-01-06 RX ORDER — NICOTINE 21 MG/24HR
1 PATCH, TRANSDERMAL 24 HOURS TRANSDERMAL DAILY
Status: DISCONTINUED | OUTPATIENT
Start: 2023-01-06 | End: 2023-01-07

## 2023-01-06 RX ORDER — FENTANYL CITRATE-0.9 % NACL/PF 10 MCG/ML
100 PLASTIC BAG, INJECTION (ML) INTRAVENOUS EVERY 5 MIN PRN
Status: DISCONTINUED | OUTPATIENT
Start: 2023-01-06 | End: 2023-01-07

## 2023-01-06 RX ORDER — METOCLOPRAMIDE HYDROCHLORIDE 5 MG/ML
10 INJECTION INTRAMUSCULAR; INTRAVENOUS EVERY 6 HOURS PRN
Status: DISCONTINUED | OUTPATIENT
Start: 2023-01-06 | End: 2023-01-07

## 2023-01-06 RX ORDER — DIPHENHYDRAMINE HYDROCHLORIDE 50 MG/ML
25 INJECTION INTRAMUSCULAR; INTRAVENOUS ONCE
Status: COMPLETED | OUTPATIENT
Start: 2023-01-06 | End: 2023-01-06

## 2023-01-06 RX ORDER — METHYLERGONOVINE MALEATE 0.2 MG/ML
200 INJECTION INTRAVENOUS
Status: DISCONTINUED | OUTPATIENT
Start: 2023-01-06 | End: 2023-01-07

## 2023-01-06 RX ORDER — NALOXONE HYDROCHLORIDE 0.4 MG/ML
0.4 INJECTION, SOLUTION INTRAMUSCULAR; INTRAVENOUS; SUBCUTANEOUS
Status: DISCONTINUED | OUTPATIENT
Start: 2023-01-06 | End: 2023-01-06

## 2023-01-06 RX ORDER — CARBOPROST TROMETHAMINE 250 UG/ML
250 INJECTION, SOLUTION INTRAMUSCULAR
Status: DISCONTINUED | OUTPATIENT
Start: 2023-01-06 | End: 2023-01-08 | Stop reason: HOSPADM

## 2023-01-06 RX ORDER — SODIUM CHLORIDE, SODIUM LACTATE, POTASSIUM CHLORIDE, CALCIUM CHLORIDE 600; 310; 30; 20 MG/100ML; MG/100ML; MG/100ML; MG/100ML
INJECTION, SOLUTION INTRAVENOUS
Status: DISCONTINUED
Start: 2023-01-06 | End: 2023-01-06 | Stop reason: HOSPADM

## 2023-01-06 RX ORDER — BUPRENORPHINE AND NALOXONE 8; 2 MG/1; MG/1
1 FILM, SOLUBLE BUCCAL; SUBLINGUAL DAILY
Status: DISCONTINUED | OUTPATIENT
Start: 2023-01-07 | End: 2023-01-08 | Stop reason: HOSPADM

## 2023-01-06 RX ORDER — CARBOPROST TROMETHAMINE 250 UG/ML
250 INJECTION, SOLUTION INTRAMUSCULAR
Status: DISCONTINUED | OUTPATIENT
Start: 2023-01-06 | End: 2023-01-07

## 2023-01-06 RX ORDER — LIDOCAINE HYDROCHLORIDE 10 MG/ML
INJECTION, SOLUTION EPIDURAL; INFILTRATION; INTRACAUDAL; PERINEURAL
Status: DISCONTINUED
Start: 2023-01-06 | End: 2023-01-06 | Stop reason: WASHOUT

## 2023-01-06 RX ORDER — ONDANSETRON 2 MG/ML
4 INJECTION INTRAMUSCULAR; INTRAVENOUS EVERY 6 HOURS PRN
Status: DISCONTINUED | OUTPATIENT
Start: 2023-01-06 | End: 2023-01-07

## 2023-01-06 RX ORDER — ONDANSETRON 4 MG/1
4 TABLET, ORALLY DISINTEGRATING ORAL EVERY 6 HOURS PRN
Status: DISCONTINUED | OUTPATIENT
Start: 2023-01-06 | End: 2023-01-07

## 2023-01-06 RX ORDER — BISACODYL 10 MG
10 SUPPOSITORY, RECTAL RECTAL DAILY PRN
Status: DISCONTINUED | OUTPATIENT
Start: 2023-01-06 | End: 2023-01-08 | Stop reason: HOSPADM

## 2023-01-06 RX ORDER — OXYTOCIN/0.9 % SODIUM CHLORIDE 30/500 ML
1-24 PLASTIC BAG, INJECTION (ML) INTRAVENOUS CONTINUOUS
Status: DISCONTINUED | OUTPATIENT
Start: 2023-01-06 | End: 2023-01-07

## 2023-01-06 RX ORDER — TRANEXAMIC ACID 10 MG/ML
1 INJECTION, SOLUTION INTRAVENOUS EVERY 30 MIN PRN
Status: DISCONTINUED | OUTPATIENT
Start: 2023-01-06 | End: 2023-01-08 | Stop reason: HOSPADM

## 2023-01-06 RX ORDER — HYDROCORTISONE 25 MG/G
CREAM TOPICAL 3 TIMES DAILY PRN
Status: DISCONTINUED | OUTPATIENT
Start: 2023-01-06 | End: 2023-01-08 | Stop reason: HOSPADM

## 2023-01-06 RX ORDER — PROCHLORPERAZINE 25 MG
25 SUPPOSITORY, RECTAL RECTAL EVERY 12 HOURS PRN
Status: DISCONTINUED | OUTPATIENT
Start: 2023-01-06 | End: 2023-01-07

## 2023-01-06 RX ORDER — SODIUM CHLORIDE, SODIUM LACTATE, POTASSIUM CHLORIDE, CALCIUM CHLORIDE 600; 310; 30; 20 MG/100ML; MG/100ML; MG/100ML; MG/100ML
INJECTION, SOLUTION INTRAVENOUS
Status: COMPLETED
Start: 2023-01-06 | End: 2023-01-06

## 2023-01-06 RX ORDER — METHYLERGONOVINE MALEATE 0.2 MG/ML
200 INJECTION INTRAVENOUS
Status: DISCONTINUED | OUTPATIENT
Start: 2023-01-06 | End: 2023-01-08 | Stop reason: HOSPADM

## 2023-01-06 RX ORDER — DIPHENHYDRAMINE HCL 25 MG
25 CAPSULE ORAL ONCE
Status: COMPLETED | OUTPATIENT
Start: 2023-01-06 | End: 2023-01-06

## 2023-01-06 RX ORDER — MISOPROSTOL 200 UG/1
TABLET ORAL
Status: DISCONTINUED
Start: 2023-01-06 | End: 2023-01-06 | Stop reason: HOSPADM

## 2023-01-06 RX ORDER — FENTANYL CITRATE 50 UG/ML
100 INJECTION, SOLUTION INTRAMUSCULAR; INTRAVENOUS
Status: DISCONTINUED | OUTPATIENT
Start: 2023-01-06 | End: 2023-01-07

## 2023-01-06 RX ORDER — NALOXONE HYDROCHLORIDE 0.4 MG/ML
0.4 INJECTION, SOLUTION INTRAMUSCULAR; INTRAVENOUS; SUBCUTANEOUS
Status: DISCONTINUED | OUTPATIENT
Start: 2023-01-06 | End: 2023-01-07

## 2023-01-06 RX ORDER — ACETAMINOPHEN 325 MG/1
650 TABLET ORAL EVERY 4 HOURS PRN
Status: DISCONTINUED | OUTPATIENT
Start: 2023-01-06 | End: 2023-01-08 | Stop reason: HOSPADM

## 2023-01-06 RX ORDER — LIDOCAINE 40 MG/G
CREAM TOPICAL
Status: DISCONTINUED | OUTPATIENT
Start: 2023-01-06 | End: 2023-01-07

## 2023-01-06 RX ADMIN — ACETAMINOPHEN 650 MG: 325 TABLET, FILM COATED ORAL at 09:48

## 2023-01-06 RX ADMIN — BUPIVACAINE HYDROCHLORIDE 6 ML: 2.5 INJECTION, SOLUTION EPIDURAL; INFILTRATION; INTRACAUDAL at 13:30

## 2023-01-06 RX ADMIN — DIPHENHYDRAMINE HYDROCHLORIDE 25 MG: 25 CAPSULE ORAL at 21:03

## 2023-01-06 RX ADMIN — Medication: at 13:33

## 2023-01-06 RX ADMIN — SODIUM CHLORIDE, POTASSIUM CHLORIDE, SODIUM LACTATE AND CALCIUM CHLORIDE: 600; 310; 30; 20 INJECTION, SOLUTION INTRAVENOUS at 10:01

## 2023-01-06 RX ADMIN — Medication 2 MILLI-UNITS/MIN: at 10:02

## 2023-01-06 RX ADMIN — SODIUM CHLORIDE, POTASSIUM CHLORIDE, SODIUM LACTATE AND CALCIUM CHLORIDE 1000 ML: 600; 310; 30; 20 INJECTION, SOLUTION INTRAVENOUS at 12:41

## 2023-01-06 RX ADMIN — SODIUM CHLORIDE 250 ML: 9 INJECTION, SOLUTION INTRAVENOUS at 16:57

## 2023-01-06 RX ADMIN — SODIUM CHLORIDE, POTASSIUM CHLORIDE, SODIUM LACTATE AND CALCIUM CHLORIDE 250 ML: 600; 310; 30; 20 INJECTION, SOLUTION INTRAVENOUS at 16:39

## 2023-01-06 RX ADMIN — BUPIVACAINE HYDROCHLORIDE 4 ML: 2.5 INJECTION, SOLUTION EPIDURAL; INFILTRATION; INTRACAUDAL at 13:26

## 2023-01-06 RX ADMIN — NICOTINE 1 PATCH: 14 PATCH, EXTENDED RELEASE TRANSDERMAL at 10:12

## 2023-01-06 ASSESSMENT — ACTIVITIES OF DAILY LIVING (ADL)
ADLS_ACUITY_SCORE: 18

## 2023-01-06 NOTE — PROGRESS NOTES
[facility]DR[facility]  MATERNAL CHILD HEALTH   INITIAL PSYCHOSOCIAL ASSESSMENT     DATA:     Presenting Information: Mom, Keyona, is a 32 year old  who will deliver a boy , Still deciding on a name, on 2022 at Gestational Age: <None> in the setting of  . SW was consulted for antepartum assessment due to KAYKAY history.    Living Situation: Parents, Keyona who lives in [place] at [facility]'s sober housing with her son and baby will join them.    Social Support: Reports having social support from her CPS worker Belle, her friends at treatment and her brother Geo. She admitted that she has been trying to rebuild her support network since she has stopped using.     Education/Employment: Not currently working    Insurance: [facility]     Source of Financial Support: [facility]    Mental Health History: PT endorsed that she does have a history of MH but she is currently in therapy at her [facility] program and feels it has been really positive for her. She shared that her mental health over her pregnancy has been stable.     History of Postpartum Mood Disorders: No history per pt.     Chemical Health History: Pt endorsed a history of chemical use and that she is currently taking Suboxone. She also endorsed that she has used THC during this pregnancy. SW discussed her role as a mandated . Keyona was understanding and stated that she and her CPS worker have already talked about it.     Legal/Child Protection Involvement: Keyona has an open CPS case in [facility], she was very open with this SW about her hard     INTERVENTION:       Chart review    Conducted Psychosocial Assessment    Introduction to Maternal Child Health SW role and scope of practice    Validated emotions and provided supportive listening    Provided psychoeducation on  mood and anxiety disorders    SW described process for birth certificate, social security card and insurance process.      Provided resources and referrals    bundle of Love clothing    ASSESSMENT:     Coping: Pt is coping well and did not have any specific needs besides a few clothing items for baby which SW provided her with. SW checked in about mental health and she reported that she is well connected to a therapist and has weekly sessions with her. She feels her mood has stayed well over her pregnancy and she attributes this to her therapy work.     Assessment of parental risk for PMAD: Higher than average risk due to MH history and CPS involvement.     Risk Factors: other children at home needing care and attention , limited social support, limited financial resources, hospitalization during a global pandemic, parental history of addiction , maternal mental health history or concerns  and history of child protection involvement     Resiliency Factors & Strengths: local family, experienced parents, stable housing, reliable transportation, connected with mental health support, able and willing to ask for help/accept help and willingness to have vulnerable conversations about emotions    PLAN:     SW will continue to follow for supportive intervention.  SAWYER Alexander, [facility]  Maternal and Child Health   Office: [phone]  Pager: [phone]  After Hours Pager: [phone]  [email or web address]

## 2023-01-06 NOTE — PROGRESS NOTES
"[facility]  Labor Progress Note - FHT Strip Review    S:   FHT strip reviewed.    O:   Patient Vitals for the past 4 hrs:   BP Temp Temp src Height Weight   23 1253 -- 98.2  F (36.8  C) Oral -- --   23 1058 -- -- -- 1.6 m (5' 3\") 116.6 kg (257 lb)   23 0959 106/61 -- -- -- --       FHT  Baseline: 160  Variability: Moderate  Accels: Present  Decels: Absent  [place]: 3-4 in 10 minutes    A/P:  Keyona Fisher is a 32 year old  at 39w6d by LMP, here for IOL.    #Labor:  - SVE: 3/60/-3 (0930) > pit (1002)  - Pain: Planning epidural in next hour prior to AROM  - GBS neg  - Plan: Continue augmentation with pitocin    #Fetal Well Being  - Category I FHT  - Continuous EFM  - Interventions PRN  - EFW 7#  - Cephalic by TAYLOR Frazier MD  Ob/Gyn Resident, PGY-2  2023 12:58 PM  "

## 2023-01-06 NOTE — PROVIDER NOTIFICATION
01/06/23 1215   Provider Notification   Provider Name/Title Dr Frazeir   Method of Notification In Department   Request Evaluate - Remote   Notification Reason Other (Comment)     Patient uncomfortable, would like epidural within the hour, [facility] resident notified.

## 2023-01-06 NOTE — PROGRESS NOTES
[facility]  Labor Progress Note    S:   Called to room for recurrent variables. Patient reports feeling more pressure.    O:   Patient Vitals for the past 4 hrs:   BP Temp Temp src Resp SpO2   23 1600 119/77 98.1  F (36.7  C) -- 18 98 %   23 1530 107/71 -- -- 18 98 %   23 1500 104/60 97.9  F (36.6  C) -- 16 --   23 1420 116/68 -- -- 18 97 %   23 1415 115/70 -- -- 16 98 %   23 1410 113/68 -- -- 16 98 %   23 1405 114/71 -- -- 16 97 %   23 1400 115/68 -- -- 18 98 %   23 1355 122/69 -- -- 16 98 %   23 1350 113/64 -- -- 18 99 %   23 1345 118/69 -- -- 16 99 %   23 1341 116/65 -- -- 16 99 %   23 1339 113/67 -- -- 16 99 %   23 1337 108/66 -- -- 16 99 %   23 1333 105/66 -- -- 16 100 %   23 1329 115/71 -- -- -- 100 %   23 1321 106/59 -- -- -- --   23 1254 115/62 -- -- -- (!) 88 %   23 1253 -- 98.2  F (36.8  C) Oral -- --       Gen: NAD  SVE: /-2; Chaperoned by RN  Membranes: s/p AROM    FHT  Baseline: 135  Variability: Moderate  Accels: Present  Decels: Recurrent variable declerations  [place]: 3-4 in 10 minutes    A/P:  Keyona Fisher is a 32 year old  at 39w6d by LMP, here for IOL. Now with Cat II FHT notable for recurrent variable decelerations not resolving with position changes. Planning LR bolus and starting amnioinfusion.    #Labor:  - SVE: 3/60/-3 (0930) > pit (1002) > /60/-2, AROM > /-2 (1645)  - Pain: Epidural  - GBS neg  - Plan: Continue augmentation with pitocin     #Fetal Well Being  - Category II FHT  - Continuous EFM  - Interventions PRN  - EFW 7#  - Cephalic by BSPRINCESS Frazier MD  Ob/Gyn Resident, PGY-2  2023 4:39 PM

## 2023-01-06 NOTE — ANESTHESIA PREPROCEDURE EVALUATION
Anesthesia Pre-Procedure Evaluation    Patient: Keyona Fisher   MRN: [id number] : 1990        Procedure : Labor epidural          Past Medical History:   Diagnosis Date     Depression      Heroin use      PTSD (post-traumatic stress disorder)      Tobacco dependence       History reviewed. No pertinent surgical history.   Allergies   Allergen Reactions     Ibuprofen Anaphylaxis      Social History     Tobacco Use     Smoking status: Every Day     Packs/day: 0.25     Types: Cigarettes     Smokeless tobacco: Never   Substance Use Topics     Alcohol use: Not Currently     Comment: sober 2 months      Wt Readings from Last 1 Encounters:   22 95.3 kg (210 lb)        Anesthesia Evaluation   Pt has had prior anesthetic.         ROS/MED HX  ENT/Pulmonary:       Neurologic:       Cardiovascular:       METS/Exercise Tolerance:     Hematologic:       Musculoskeletal:       GI/Hepatic: Comment: Untreated Hep C      Renal/Genitourinary:       Endo:       Psychiatric/Substance Use:     (+) H/O chronic opiod use . : history of heroin abus on suboxone.    Infectious Disease:       Malignancy:       Other:    at 39w6d presenting for IOL  - Elevated GCT         Physical Exam    Airway        Mallampati: IV   TM distance: > 3 FB   Neck ROM: full   Mouth opening: > 3 cm    Respiratory Devices and Support         Dental  no notable dental history         Cardiovascular   cardiovascular exam normal          Pulmonary   pulmonary exam normal                OUTSIDE LABS:  CBC:   CBC RESULTS: Recent Labs   Lab Test 22  1233 21  1213 20  0803 20  0425   WBC 7.7 4.9 11.3* 22.7*   HGB 12.2 12.2 12.4 13.1   HCT 34.9* 36.4 37.6 38.1    225 228 199     Last Comprehensive Metabolic Panel:  Recent Labs   Lab Test 23  0843 22  1247 22  1234 21  1213 20  0425 20  1954 19  1304   NA  --   --   --  141 135 133 143   POTASSIUM  --   --   --  3.6 3.7 3.5 4.1    CHLORIDE  --   --   --  109 100 99 110*   CO2  --   --   --  26 28 26 28   ANIONGAP  --   --   --  6 7 8 5   BUN  --   --   --  13 7 7 8   CR  --   --  0.49* 0.68 0.56 0.50* 0.65   GFRESTIMATED  --   --  >90 >90 >90 >90 >90   GLC 81 95  --  98 118* 105* 100*   CLAUS  --   --   --  8.6 9.0 9.0 8.7        Lab Results   Component Value Date    WBC 7.7 12/07/2022    WBC 4.9 05/03/2021    HGB 12.2 12/07/2022    HGB 12.2 05/03/2021    HCT 34.9 (L) 12/07/2022    HCT 36.4 05/03/2021     12/07/2022     05/03/2021     BMP:   Lab Results   Component Value Date     05/03/2021     04/18/2020    POTASSIUM 3.6 05/03/2021    POTASSIUM 3.7 04/18/2020    CHLORIDE 109 05/03/2021    CHLORIDE 100 04/18/2020    CO2 26 05/03/2021    CO2 28 04/18/2020    BUN 13 05/03/2021    BUN 7 04/18/2020    CR 0.49 (L) 12/07/2022    CR 0.68 05/03/2021    GLC 81 01/06/2023    GLC 95 12/07/2022     COAGS:   Lab Results   Component Value Date    INR 1.06 04/17/2020     POC:   Lab Results   Component Value Date    HCG Negative 09/24/2019    HCGS Negative 05/03/2021     HEPATIC:   Lab Results   Component Value Date    ALBUMIN 3.4 05/03/2021    PROTTOTAL 7.6 05/03/2021    ALT 75 (H) 12/07/2022    AST 30 12/07/2022    ALKPHOS 87 05/03/2021    BILITOTAL 0.3 05/03/2021     OTHER:   Lab Results   Component Value Date    LACT 0.9 04/17/2020    CLAUS 8.6 05/03/2021    LIPASE 31 (L) 04/17/2020       Anesthesia Plan    ASA Status:  2      Anesthesia Type: Epidural.              Consents    Anesthesia Plan(s) and associated risks, benefits, and realistic alternatives discussed. Questions answered and patient/representative(s) expressed understanding.    - Discussed:     - Discussed with:  Patient         Postoperative Care            Comments:                Christine Wallace MD

## 2023-01-06 NOTE — ANESTHESIA PROCEDURE NOTES
Epidural catheter Procedure Note    Pre-Procedure   Staff -        Anesthesiologist:  Christine Wallace MD       Resident/Fellow: Madan Renteria MD       Performed By: resident       Location: OB       Procedure Start/Stop Times: 1/6/2023 1:00 PM and 1/6/2023 1:20 PM       Pre-Anesthestic Checklist: patient identified, IV checked, risks and benefits discussed, informed consent, monitors and equipment checked, pre-op evaluation, at physician/surgeon's request and post-op pain management  Timeout:       Correct Patient: Yes        Correct Procedure: Yes        Correct Site: Yes        Correct Position: Yes   Procedure Documentation  Procedure: epidural catheter       Patient Position: sitting       Patient Prep/Sterile Barriers: sterile gloves, mask, patient draped       Skin prep: Chloraprep       Local skin infiltrated with 2 mL of 1% lidocaine.        Insertion Site: L3-4. (midline approach).       Technique: LORT saline        RUTHIE at 8 cm.       Needle Type: Rallyhoody needle       Needle Gauge: 17.        Needle Length (Inches): 3.5        Catheter: 19 G.          Catheter threaded easily.         5 cm epidural space.         Threaded 13 cm at skin.         # of attempts: 1 and  # of redirects:  0    Assessment/Narrative         Paresthesias: No.       Test dose of 3 mL lidocaine 1.5% w/ 1:200,000 epinephrine at 13:20 CST.         Test dose negative, 3 minutes after injection, for signs of intravascular, subdural, or intrathecal injection.       Insertion/Infusion Method: LORT saline       Aspiration negative for Heme or CSF via Epidural Catheter.       Sensory Level Left: T10.       Sensory Level Right: T10.    Medication(s) Administered   0.125% bupivacaine 5 mL + fentanyl 20 mcg + NS 5 mL (Epidural) (Mixture components: bupivacaine HCl (PF) 0.25 % Soln, 5 mL; fentaNYL (PF) 100 MCG/2ML Soln, 20 mcg; sodium chloride 0.9 % Soln, 5 mL) - EPIDURAL   4 mL - 1/6/2023 1:26:00 PM   6 mL - 1/6/2023 1:30:00  "PM  Medication Administration Time: 1/6/2023 1:00 PM      FOR [facility] ([facility] ONLY:   Pain Team Contact information: please page the Pain Team Via [employer]. Search \"Pain\". During daytime hours, please page the attending first. At night please page the resident first.    "

## 2023-01-06 NOTE — PROGRESS NOTES
[facility]  Labor Progress Note    S:   Patient reports feeling comfortable with epidural    O:   Patient Vitals for the past 4 hrs:   BP Temp Temp src Resp SpO2   23 1500 104/60 97.9  F (36.6  C) -- 16 --   23 1420 116/68 -- -- 18 97 %   23 1415 115/70 -- -- 16 98 %   23 1410 113/68 -- -- 16 98 %   23 1405 114/71 -- -- 16 97 %   23 1400 115/68 -- -- 18 98 %   23 1355 122/69 -- -- 16 98 %   23 1350 113/64 -- -- 18 99 %   23 1345 118/69 -- -- 16 99 %   23 1341 116/65 -- -- 16 99 %   23 1339 113/67 -- -- 16 99 %   23 1337 108/66 -- -- 16 99 %   23 1333 105/66 -- -- 16 100 %   23 1329 115/71 -- -- -- 100 %   23 1321 106/59 -- -- -- --   23 1254 115/62 -- -- -- (!) 88 %   23 1253 -- 98.2  F (36.8  C) Oral -- --       Gen: NAD  SVE: /-2; Chaperoned by RN  Membranes: AROM for mec-stained fluid    FHT  Baseline: 120  Variability: Moderate  Accels: Present  Decels: Absent  [place]: 2-3 in 10 minutes    A/P:  Keyona Fisher is a 32 year old  at 39w6d by LMP, here for IOL.    #Labor:  - SVE: 3/60/-3 (0930) > pit (1002) > /-2, AROM  - Pain: Epidural  - GBS neg  - Plan: Continue augmentation with pitocin     #Fetal Well Being  - Category I FHT  - Continuous EFM  - Interventions PRN  - EFW 7#  - Cephalic by BSPRINCESS Frazier MD  Ob/Gyn Resident, PGY-2  2023 3:14 PM

## 2023-01-06 NOTE — H&P
L&D History and Physical   2023  Keyona Fisher  [id number]      HPI:   Keyona Fisher is a 32 year old  at 39w6d by LMP who presents for IOL. Pregnancy complicated as below.    She states that she is feeling well today.  Denies LOF or VB. Good FM. Occasional contractions. She reports a headache for the last 4-5 days for which she has tried caffeine and tylenol without much improvement. Otherwise F/C, vision changes, loss of taste/smell, cough, sore throat, CP, SOB, RUQ pain, N/V, dysuria, constipation, diarrhea, increased edema.    Her pregnancy has been complicated by:  - OUD - PTA suboxone 8 mg  - Hep C, not treated  - Hx syphilis s/p treatment  - Nicotine use  - Failed GCT, no GTT    OBHX:   OB History    Para Term  AB Living   6 4 4 0 1 4   SAB IAB Ectopic Multiple Live Births   0 0 0 0 4      # Outcome Date GA Lbr [place]/2nd Weight Sex Delivery Anes PTL Lv   6 Current            5 Term 18 38w3d 03:05 / 00:03 3.02 kg (6 lb 10.5 oz) M Vag-Spont EPI N OLE      Name: MITZI FISHER      Apgar1: 9  Apgar5: 9   4 AB            3 Term         OLE   2 Term         OLE   1 Term         OLE       Past Medical History:   Diagnosis Date     Depression      Heroin use      PTSD (post-traumatic stress disorder)      Tobacco dependence        History reviewed. No pertinent surgical history.    Medications:   No current facility-administered medications on file prior to encounter.  Acetaminophen 325 MG CAPS, Take 325-650 mg by mouth every 4 hours as needed  buprenorphine HCl-naloxone HCl (SUBOXONE) 8-2 MG per film, Place 1 Film under the tongue 2 times daily  Prenatal Vit-Fe Fumarate-FA (PRENATAL MULTIVITAMIN W/IRON) 27-0.8 MG tablet, Take 1 tablet by mouth daily  QUEtiapine (SEROQUEL) 100 MG tablet, Take 100 mg by mouth At Bedtime        Allergies   Allergen Reactions     Ibuprofen Anaphylaxis       Family History   Problem Relation Age of Onset     Diabetes Mother      Coronary Artery  Disease Father      Diabetes Father      Coronary Artery Disease Maternal Grandmother      Coronary Artery Disease Maternal Grandfather        SocialHX:   Social History     Tobacco Use     Smoking status: Every Day     Packs/day: 0.25     Types: Cigarettes     Smokeless tobacco: Never   Substance Use Topics     Alcohol use: Not Currently     Comment: sober 2 months     Drug use: Not Currently     Comment: sober 2 months       ROS: 10-point ROS negative except as indicated in HPI.    Physical Exam:  Vitals:    23 0756   BP: 115/62   Resp: 18   Temp: 99  F (37.2  C)     General: alert, oriented female, resting in bed in NAD  CV: regular rate and rhythm  Lungs: clear bilaterally, no crackles or wheezes.  Abdomen: soft, gravid, non-tender, EFW 7#.  Extremities: bilateral lower extremities non-tender with trace edema    SVE: 3/60/-3  Membranes: Intact    Presentation: Cephalic by BSUS    FHT  Baseline: 125  Variability: Moderate  Accels: Present  Decels: Absent  Standard: 1-2 in 10 minutes    Prenatal ultrasounds:  Growth US (): EFW 40%ile    Prenatal Labs:   Lab Results   Component Value Date    ABO O 2018    RH Pos 2018    AS Negative 2022    CHPCRT Negative 2022    GCPCRT Negative 2022    TREPAB Negative 2018    HGB 12.2 2022    HIV Negative 2008       GBS Status:   Lab Results   Component Value Date    GBS Negative 2018       No results found for: PAP    Labs:   Results for orders placed or performed during the hospital encounter of 23 (from the past 24 hour(s))   Glucose by meter   Result Value Ref Range    GLUCOSE BY METER POCT 81 70 - 99 mg/dL       A/P:   32 year old  at 39w6d by LMP who presents for IOL. Pregnancy complicated as below:    #IOL  - Admit to labor and delivery for IOL  - SVE: 3/60/-3 (930)  - Pain: Planning epidural  - GBS neg  - Plan: Begin induction with pitocin    #OUD  - Suboxone 8 mg daily  - Reports last use was ~ 11  months ago  - SW consult postpartum    #Elevated GCT, no GTT  - fBG 81  - QID BG checks  - HgbA1c pending    #NUD  - Nicotine replacement    #Hep C  - Not treated  - VL and LFTs pending    #Fetal Well Being  - Category I FHT  - Continuous EFM  - Interventions PRN  - EFW 7#    #PNC:     - BMI not measured, > 30  - Rh positive, Rubella immune, GCT elevated  - Hep B Antigen neg  - GBS neg  - Other infectious labs neg  - Placenta: anterior, desires to take home    Patient seen and care plan discussed under supervision of Dr. Molina.    Rebeca Frazier MD  Ob/Gyn Resident, PGY-2  01/06/2023 9:41 AM     Appreciate Dr. Frazier's note above, patient also seen and examined by me. I agree with the note above.   Tanya Molina MD

## 2023-01-06 NOTE — PROGRESS NOTES
[facility]  Labor Progress Note    S:   Came back to room for ongoing recurrent variables. Patient reports feeling more pressure and pain with each contraction.    O:   Patient Vitals for the past 4 hrs:   BP Temp Resp SpO2   23 1600 119/77 98.1  F (36.7  C) 18 98 %   23 1530 107/71 -- 18 98 %   23 1500 104/60 97.9  F (36.6  C) 16 --   23 1420 116/68 -- 18 97 %   23 1415 115/70 -- 16 98 %   23 1410 113/68 -- 16 98 %   23 1405 114/71 -- 16 97 %   23 1400 115/68 -- 18 98 %   23 1355 122/69 -- 16 98 %   23 1350 113/64 -- 18 99 %   23 1345 118/69 -- 16 99 %   23 1341 116/65 -- 16 99 %   23 1339 113/67 -- 16 99 %   23 1337 108/66 -- 16 99 %   23 1333 105/66 -- 16 100 %   23 1329 115/71 -- -- 100 %   23 1321 106/59 -- -- --       Gen: NAD  SVE: 5/80/-2, unchanged. IUPC placed and amnioinfusion started; Chaperoned by RN  Membranes: s/p AROM    FHT  Baseline: 130  Variability: Moderate  Accels: Present  Decels: Recurrent variable declerations  [place]: 3-4 in 10 minutes    A/P:  Keyona Fisher is a 32 year old  at 39w6d by LMP, here for IOL. Now with Cat II FHT notable for recurrent variable decelerations not resolving with position changes. Started amnioinfusion and decreased pitocin from 14 to 10.    #Labor:  - SVE: 3/60/-3 (0930) > pit (1002) > 4/60/-2, AROM > 5/80/-2 (1645)  - Pain: Epidural  - GBS neg  - Plan: Decreased pitocin. Amnioinfusion running. Continue to monitor closely, low threshold to stop pitocin     #Fetal Well Being  - Category II FHT notable for recurrent variable declerations  - Continuous EFM  - Interventions PRN  - EFW 7#  - Cephalic by BSPRINCESS Frazier MD  Ob/Gyn Resident, PGY-2  2023 4:57 PM

## 2023-01-07 LAB
HCV RNA SERPL NAA+PROBE-ACNC: ABNORMAL IU/ML
HCV RNA SERPL NAA+PROBE-LOG IU: 5.7 {LOG_IU}/ML
HGB BLD-MCNC: 12.1 G/DL (ref 11.7–15.7)

## 2023-01-07 PROCEDURE — 36415 COLL VENOUS BLD VENIPUNCTURE: CPT | Performed by: STUDENT IN AN ORGANIZED HEALTH CARE EDUCATION/TRAINING PROGRAM

## 2023-01-07 PROCEDURE — 85018 HEMOGLOBIN: CPT | Performed by: STUDENT IN AN ORGANIZED HEALTH CARE EDUCATION/TRAINING PROGRAM

## 2023-01-07 PROCEDURE — 250N000013 HC RX MED GY IP 250 OP 250 PS 637: Performed by: STUDENT IN AN ORGANIZED HEALTH CARE EDUCATION/TRAINING PROGRAM

## 2023-01-07 PROCEDURE — 120N000002 HC R&B MED SURG/OB UMMC

## 2023-01-07 PROCEDURE — 99232 SBSQ HOSP IP/OBS MODERATE 35: CPT | Mod: GC | Performed by: OBSTETRICS & GYNECOLOGY

## 2023-01-07 RX ORDER — HYDROXYZINE HYDROCHLORIDE 50 MG/1
50 TABLET, FILM COATED ORAL EVERY 6 HOURS PRN
Status: DISCONTINUED | OUTPATIENT
Start: 2023-01-07 | End: 2023-01-08 | Stop reason: HOSPADM

## 2023-01-07 RX ORDER — CYCLOBENZAPRINE HCL 5 MG
10 TABLET ORAL EVERY 8 HOURS PRN
Status: DISCONTINUED | OUTPATIENT
Start: 2023-01-07 | End: 2023-01-08 | Stop reason: HOSPADM

## 2023-01-07 RX ORDER — HYDROXYZINE HYDROCHLORIDE 25 MG/1
25 TABLET, FILM COATED ORAL EVERY 6 HOURS PRN
Status: DISCONTINUED | OUTPATIENT
Start: 2023-01-07 | End: 2023-01-08 | Stop reason: HOSPADM

## 2023-01-07 RX ADMIN — ACETAMINOPHEN 650 MG: 325 TABLET, FILM COATED ORAL at 06:09

## 2023-01-07 RX ADMIN — CYCLOBENZAPRINE HYDROCHLORIDE 10 MG: 5 TABLET, FILM COATED ORAL at 09:03

## 2023-01-07 RX ADMIN — BUPRENORPHINE AND NALOXONE 1 FILM: 8; 2 FILM, SOLUBLE BUCCAL; SUBLINGUAL at 07:26

## 2023-01-07 RX ADMIN — HYDROXYZINE HYDROCHLORIDE 50 MG: 50 TABLET, FILM COATED ORAL at 18:54

## 2023-01-07 RX ADMIN — HYDROXYZINE HYDROCHLORIDE 25 MG: 25 TABLET, FILM COATED ORAL at 09:03

## 2023-01-07 RX ADMIN — ACETAMINOPHEN 650 MG: 325 TABLET, FILM COATED ORAL at 14:06

## 2023-01-07 RX ADMIN — ACETAMINOPHEN 650 MG: 325 TABLET, FILM COATED ORAL at 10:01

## 2023-01-07 RX ADMIN — ACETAMINOPHEN 650 MG: 325 TABLET, FILM COATED ORAL at 00:27

## 2023-01-07 RX ADMIN — DOCUSATE SODIUM 100 MG: 100 CAPSULE, LIQUID FILLED ORAL at 09:02

## 2023-01-07 RX ADMIN — CYCLOBENZAPRINE HYDROCHLORIDE 10 MG: 5 TABLET, FILM COATED ORAL at 20:39

## 2023-01-07 RX ADMIN — BENZOCAINE AND LEVOMENTHOL: 200; 5 SPRAY TOPICAL at 00:28

## 2023-01-07 RX ADMIN — ACETAMINOPHEN 650 MG: 325 TABLET, FILM COATED ORAL at 18:54

## 2023-01-07 ASSESSMENT — ACTIVITIES OF DAILY LIVING (ADL)
ADLS_ACUITY_SCORE: 18

## 2023-01-07 NOTE — PROGRESS NOTES
[facility]  Labor Progress Note    S: Patient is feeling more pressure    O:   Vitals:    23 1815 23 1830 23 1900 23   BP: 130/74 122/80 102/71 112/70   Pulse: 68 75 87    Resp: 18 16 16 19   Temp:   99.1  F (37.3  C)    TempSrc:    Oral   SpO2: 98%  98% 100%   Weight:       Height:         GEN:  Alert, no acute distress  SVE: 8/-1, some cervical swelling    FHT: Baseline 135, moderate variability, + accelerations,  decelerations  [place]: 1-2 contractions in 10 minutes, inadequate    A/P:  Keyona Fisher is a 32 year old  at 39w6d, here for IOL for KAYKAY.    # labor  - in active labor, now without cervical change from last exam.   - Pitocin was recently restarted as she was category I with inadequate contractions  - cervix feels swollen, will give a dose of benadryl  - GBS negative  - pain management: epidural, not working well, RN to call anesthesia  - anticipate     # fetal well being  - category 2 FHT, continues to have moderate variability. Will reposition, give fluid bolus prn  - continuous fetal monitoring  - intrauterine resuscitation PRN    Eyal Nunez MD  OB/GYN PGY-3  2023 8:55 PM

## 2023-01-07 NOTE — L&D DELIVERY NOTE
OB Vaginal Delivery Note    Keyona Fisher MRN# [id number]   Age: 32 year old YOB: 1990       GA: 39w6d  GP:   Labor Complications: None   EBL:   mL  Delivery QBL: 125 mL  Delivery Type: Vaginal, Spontaneous   ROM to Delivery Time: (Delivered) Hours: 7 Minutes: 48   Weight: 3.36 kg (7 lb 6.5 oz)    1 Minute 5 Minute 10 Minute   Apgar Totals: 9   9        CLEVE, CHET HART;KELSEY NUNEZ     L&LIZ Delivery Note:     Keyona Fisher is a 32 year old now  who presented at 39w6d for IOL for OUD.  Pregnancy was notable for hep C, elevated GCT/no GTT. Her IOL began with pitocin. AROM was performed at 4cm. She had an intermittent category II FHT, and made slow progress in active labor. She received an epidural for pain management. She progressed to complete and pushed over 2 contractions, after which she had a spontaneous vaginal delivery of a viable male infant in OA position.  The umbilical cord was around the body, reduced after delivery. Apgars of 9 and 9 with weight of 3360 g.  The cord was double clamped after 60 seconds and cut.  A cord segment and cord blood were obtained. IV pitocin was started for PPH prophylaxis. The placenta was then delivered using gentle traction and suprapubic pressure.  The uterus was noted to be firm after fundal massage.  The perineum was assessed for lacerations and none were noted, she had small periurethral abrasions that were hemostatic.   Total QBL was 125mL.  The placenta appeared intact with a 3V umbilical cord.  Dr. Molina was present for the entire procedure.     Kelsey Nunez MD  OB/GYN PGY-3  2023 12:28 AM    I was present and scrubbed throughout the procedure,  I agree with the note above  Chet Molina MD       Natalia, Male-Keyona [[id number]]    Labor Event Times    Labor onset date: 23 Onset time: 12:00 PM   Dilation complete date: 23 Complete time: 10:43 PM   Start pushing date/time: 2023 2243      Labor Length    1st  Stage (hrs): 10 (min): 43   2nd Stage (hrs): 0 (min): 14   3rd Stage (hrs): 0 (min): 9      Labor Events     labor?: No   steroids: None  Labor Type: Induction/Cervical ripening  Predominate monitoring during 1st stage: continuous electronic fetal monitoring     Rupture identifier: Sac 1  Rupture date/time: 23 1509   Rupture type: Artificial Rupture of Membranes  Fluid color: Clear, Bloody, Meconium  Fluid odor: Normal     Induction: Oxytocin  Induction date/time: 23 1002   Cervical ripening date/time:     Indications for induction: Elective     Augmentation: Oxytocin, AROM  Indications for augmentation: Ineffective Contraction Pattern, Fetal Heart Rate or Rhythm Abnormality  1:1 continuous labor support provided by?: RN       Delivery/Placenta Date and Time    Delivery Date: 23 Delivery Time: 10:57 PM   Placenta Date/Time: 2023 11:07 PM  Oxytocin given at the time of delivery: after delivery of baby  Delivering clinician: Eyal Nunez MD   Other personnel present at delivery:  Provider Role   Tanya Molina MD Obstetrician   Eyal Nunez MD Resident         Vaginal Counts     Initial count performed by 2 team members:  Two Team Members   Glenn JACINTO       [place] Suture Needles Sponges (RETIRED) Instruments   Initial counts 2 0 5    Added to count       Relief counts       Final counts 2  5          Placed during labor Accounted for at the end of labor   FSE No    IUPC Yes    Cervidil No               Final count performed by 2 team members:  Two Team Members   Glenn Nunez      Final count correct?: Yes     Apgars    Living status: Living   1 Minute 5 Minute 10 Minute 15 Minute 20 Minute   Skin color: 1  1       Heart rate: 2  2       Reflex irritability: 2  2       Muscle tone: 2  2       Respiratory effort: 2  2       Total: 9  9       Apgars assigned by: GLENN JACINTO     Cord    Vessels: 3 Vessels    Cord Complications: None               Cord Blood Disposition:  "Lab    Gases Sent?: Yes    Delayed cord clamping?: Yes    Cord Clamping Delay (seconds): 31-60 seconds    Stem cell collection?: No       Calder Resuscitation    Methods: None  Calder Care at Delivery: Term  male delivered at 2257 with spontaneous strong cry and good tone on mom's abdomen. Warm blanket applied. Bulb suction at bedside PRN.        Measurements    Weight: 7 lb 6.5 oz Length: 1' 9\"   Head circumference: 34.9 cm       Skin to Skin and Feeding Plan    Skin to skin initiation date/time: 1841    Skin to skin with: Mother  Skin to skin end date/time: 1841       Labor Events and Shoulder Dystocia    Fetal Tracing Prior to Delivery: Category 2  Shoulder dystocia present?: Neg     Delivery (Maternal) (Provider to Complete) (324789)    Episiotomy: None  Perineal lacerations: None    Repair suture: None  Genital tract inspection done: Pos     Blood Loss  Mother: Keyona Fisher R #[id number]   Start of Mother's Information    Delivery Blood Loss  23 1200 - 23 0028    Delivery QBL (mL) Hospital Encounter 125 mL    Total  125 mL         End of Mother's Information  Mother: Keyona Fisher R #[id number]          Delivery - Provider to Complete ([id number])    Delivering clinician: Eyal Nunez MD  Attempted Delivery Types (Choose all that apply): Spontaneous Vaginal Delivery  Delivery Type (Choose the 1 that will go to the Birth History): Vaginal, Spontaneous                   Other personnel:  Provider Role   Tanya Molina MD Obstetrician   Eyal Nunez MD Resident                Placenta    Date/Time: 2023 11:07 PM  Removal: Spontaneous  Disposition: Patient possesion           Anesthesia    Method: Epidural                Presentation and Position    Presentation: Vertex    Position: Middle Occiput Anterior                 Eyal Nunez MD  "

## 2023-01-07 NOTE — PROGRESS NOTES
"Postpartum Progress Note  Keyona Fisher  [id number]    Subjective:   Doing well this morning. Has some cramping this morning, tylenol helps only a little. Has an allergy to ibuprofen, so hoping to work on pain control today.  She is ambulating without dizziness. Tolerating PO intake without nausea or vomiting. Lochia is similar to a period. Voiding spontaneously. Breast and bottle feeding well. No headache, vision changes, CP, SOB, RUQ pain.     Objective:  /57 (BP Location: Left arm, Patient Position: Semi-Sims's, Cuff Size: Adult Large)   Pulse 79   Temp 97.9  F (36.6  C) (Oral)   Resp 17   Ht 1.6 m (5' 3\")   Wt 116.6 kg (257 lb)   LMP 2022   SpO2 97%   Breastfeeding Unknown   BMI 45.53 kg/m      General: NAD, comfortable, holding baby in bed  Heart: Regular rate, extremities warm and well-perfused  Lungs: Breathing comfortably, on room air  Abdomen: Soft, non-tender, non-distended; fundus firm below umbilicus  Extremities: no edema in BLE      Assessment/Plan: 32 year old  who is PPD#1 s/p . Pregnancy was notable for OUD. Currently stable and doing well.     OUD  - continue PTA suboxone    Hep C  - has not been treated  - LFTs stable on admission  - viral load from admission pending  - will refer to GI PP     Postpartum  - Continue routine post-partum cares.     - Heme: Appropriate blood loss during delivery. AM hgb pending. Continue to monitor for s/s of anemia. Repeat labs prn. will discharge home with PO iron prn  - Pain: Continue PRN tylenol, she has an anaphylactic reaction to ibuprofen. Will add flexeril and vistaril today. She would like to avoid oxycodone  - GI: PRN senna, miralax, simethicone, and anti-emetics.  - : Voiding spontaneously   - Baby:  Stable, breast and bottle feeding, will stay for eat/sleep/console x 5 days.  - Contraception: Discussed pregnancy spacing, declines at this time, will discuss more at PP appt  - Rh pos, Rubella immune, GBS neg  - PPx: " Encourage ambulation    Dispo: Discharge to home when meeting postpartum goals, anticipate tomorrow    Eyal Nunez MD  OB/GYN PGY-3  01/07/2023 8:36 AM    Hemoglobin   Date Value Ref Range Status   01/07/2023 12.1 11.7 - 15.7 g/dL Final   01/06/2023 13.2 11.7 - 15.7 g/dL Final   05/03/2021 12.2 11.7 - 15.7 g/dL Final   04/19/2020 12.4 11.7 - 15.7 g/dL Final     Appreciate Dr. Nunez's note above, patient also seen and examined by me. I agree with the note above.   Tanya Molina MD

## 2023-01-07 NOTE — DISCHARGE SUMMARY
[facility] Discharge Summary    Keyona Fisher MRN# [id number]   Age: 32 year old YOB: 1990     Date of Admission:  2023  Date of Discharge::  2023  Admitting Physician:  Tanya Molina MD  Discharge Physician:  Tanya Molina MD          Admission Diagnoses:   -   - IUP at 39w6d  - opioid use disorder  - hep C  - history of syphilis s/p treatment  - nicotine use  - elevated GCT, no GTT          Discharge Diagnosis:   - Postpartum, s/p delivery of viable infant            Procedures:     Procedure(s): - Normal spontaneous vaginal delivery  - Epidural              Medications Prior to Admission:     Medications Prior to Admission   Medication Sig Dispense Refill Last Dose     Acetaminophen 325 MG CAPS Take 325-650 mg by mouth every 4 hours as needed   Past Week     buprenorphine HCl-naloxone HCl (SUBOXONE) 8-2 MG per film Place 1 Film under the tongue 2 times daily   2023     Prenatal Vit-Fe Fumarate-FA (PRENATAL MULTIVITAMIN W/IRON) 27-0.8 MG tablet Take 1 tablet by mouth daily   2023     QUEtiapine (SEROQUEL) 100 MG tablet Take 100 mg by mouth At Bedtime   Past Month             Discharge Medications:        Review of your medicines      START taking      Dose / Directions   cyclobenzaprine 10 MG tablet  Commonly known as: FLEXERIL  Used for:  (normal spontaneous vaginal delivery)      Dose: 10 mg  Take 1 tablet (10 mg) by mouth every 8 hours as needed for muscle spasms  Quantity: 15 tablet  Refills: 0     hydrOXYzine 25 MG tablet  Commonly known as: ATARAX  Used for:  (normal spontaneous vaginal delivery)      Dose: 25 mg  Take 1 tablet (25 mg) by mouth every 6 hours as needed for other (adjuvant pain)  Quantity: 15 tablet  Refills: 0     senna-docusate 8.6-50 MG tablet  Commonly known as: SENOKOT-S/PERICOLACE  Used for:  (normal spontaneous vaginal delivery)      Dose: 1 tablet  Take 1 tablet by mouth daily Start after delivery.  Quantity: 100  tablet  Refills: 0        CONTINUE these medicines which may have CHANGED, or have new prescriptions. If we are uncertain of the size of tablets/capsules you have at home, strength may be listed as something that might have changed.      Dose / Directions   * acetaminophen 325 MG tablet  Commonly known as: TYLENOL  This may have changed: You were already taking a medication with the same name, and this prescription was added. Make sure you understand how and when to take each.  Used for:  (normal spontaneous vaginal delivery)      Dose: 650 mg  Take 2 tablets (650 mg) by mouth every 6 hours as needed for mild pain Start after Delivery.  Quantity: 100 tablet  Refills: 0     * Acetaminophen 325 MG Caps  This may have changed: Another medication with the same name was added. Make sure you understand how and when to take each.      Dose: 325-650 mg  Take 325-650 mg by mouth every 4 hours as needed  Refills: 0         * This list has 2 medication(s) that are the same as other medications prescribed for you. Read the directions carefully, and ask your doctor or other care provider to review them with you.            CONTINUE these medicines which have NOT CHANGED      Dose / Directions   buprenorphine HCl-naloxone HCl 8-2 MG per film  Commonly known as: SUBOXONE      Dose: 1 Film  Place 1 Film under the tongue 2 times daily  Refills: 0     prenatal multivitamin w/iron 27-0.8 MG tablet      Dose: 1 tablet  Take 1 tablet by mouth daily  Refills: 0     QUEtiapine 100 MG tablet  Commonly known as: SEROquel      Dose: 100 mg  Take 100 mg by mouth At Bedtime  Refills: 0           Where to get your medicines      These medications were sent to [facility] [place], [place] - [address]  [address] [address]    Phone: [phone]     acetaminophen 325 MG tablet    cyclobenzaprine 10 MG tablet    hydrOXYzine 25 MG tablet    senna-docusate 8.6-50 MG tablet                Consultations:   Anesthesiology  Social Work          Brief History of Admission and Labor:   Keyona Fisher is a 32 year old now  who presented at 39w6d for IOL for OUD.  Pregnancy was notable for hep C, elevated GCT/no GTT. Her IOL began with pitocin. AROM was performed at 4cm. She had an intermittent category II FHT, and made slow progress in active labor. She received an epidural for pain management. She progressed to complete and pushed over 2 contractions, after which she had a spontaneous vaginal delivery of a viable male infant in OA position.  No nuchal cord was noted.  Apgars of 9 and 9 with weight of 3360 g.  The cord was double clamped after 60 seconds and cut.  A cord segment and cord blood were obtained. IV pitocin was started for PPH prophylaxis. The placenta was then delivered using gentle traction and suprapubic pressure.  The uterus was noted to be firm after fundal massage.  The perineum was assessed for lacerations and none were noted, she had small periurethral abrasions that were hemostatic.   Total QBL was 125mL.  The placenta appeared intact with a 3V umbilical cord.          Postpartum Hospital Course:   The patient's postpartum course was unremarkable.  On discharge, her pain was well controlled. Vaginal bleeding is similar to peak menstrual flow.  Voiding without difficulty.  Ambulating well and tolerating a normal diet.  No fever.  Breastfeeding/bottlefeeding well.  Infant is stable.  She was discharged on post-partum day #2.    High Hep C viral load noted this admission. Will refer to GI as outpatient. LFTs wnl.     Post-partum hemoglobin: 12.1    Contraception: Declines    Rhogam was not indicated          Discharge Instructions and Follow-Up:     Discharge diet: Regular   Discharge activity: Activity as tolerated   Discharge follow-up: Follow up with your primary OBGYN provider in six weeks for a routine postpartum visit     Wound care: Drink plenty of fluids  Ice to area for  comfort  Keep wound clean and dry            Discharge Disposition:     Discharged to home      Attestation:  I have reviewed today's vital signs, notes, medications, labs and imaging.    Vanessa Hernadez MD  Ob/Gyn PGY-1  01/08/23 8:49 AM    I saw and evaluated this patient prior to discharge. I discussed the patient with the resident and agree with plan of care as documented in the resident note.   I personally reviewed vital signs, medications, labs.   I personally spent 10 minutes on discharge activities.  Tanya Molina MD

## 2023-01-07 NOTE — PROGRESS NOTES
Pt transferred to postpartum via wheelchair and RN x2, infant carried in mothers arms. Report given to Chiquita RN to assume care of patient bands verified with RN. Delivery recovery completed, vaginal bleeding within normal limits, fundus firm without massage and at the umbilicus. Mother up to RR to void, stable on her feet, no dizziness. Pt educated on when to call RN.

## 2023-01-07 NOTE — PROGRESS NOTES
"OB Staff  S: feels like contractions have been strong.  Thinks baby is asleep, hasn't been moving as much since last exam.  O: /71   Pulse 87   Temp 99.1  F (37.3  C)   Resp 16   Ht 1.6 m (5' 3\")   Wt 116.6 kg (257 lb)   LMP 2022   SpO2 98%   BMI 45.53 kg/m    Gen'L:  Uncomfortable with contractions  SVE: /-1, change from last exam, anterior lip is mildly swollen, minimal cervix posteriorly    FHT: baseline 140, minimal variability, decels to 120 with contractions.  At time of exam with scal stim accel to 160, moderate variability, currently category 1 tracing  [place]: q 4-5 minutes    A/P: 33 yo  at 39w6d IOL  - small amount of cervix change, fetal vertex is much better applied and in lower station  - continue expectant management, if remains category 1 for 30 minutes will restart pitocin  - anesthesia to see for possible bolus.    Tanya Molina MD     "

## 2023-01-07 NOTE — PROGRESS NOTES
"OB Staff Labor Note    S: more comfortable with epidural bolus.  Feeling pressure    O:   /70   Pulse 72   Temp 98.7  F (37.1  C) (Oral)   Resp 16   Ht 1.6 m (5' 3\")   Wt 116.6 kg (257 lb)   LMP 04/02/2022   SpO2 99%   BMI 45.53 kg/m    Gen'l: appears much more comfortable  FHT: baseline 135, now 150, moderate variability, variable decels with early timing since 16:10  [place]: q 2-4 minutes, pitocin off since 1720  SVE: 7/90/-2 unchanged, OP position, attempted manual rotation    A/P 33 yo  at 39w6d IOL, category 2 FHT  - labor progress slowing, now s/p manual rotation  - pitocin off, will reassess in 30 minutes    Tanya Molina MD   "

## 2023-01-08 VITALS
SYSTOLIC BLOOD PRESSURE: 104 MMHG | RESPIRATION RATE: 16 BRPM | TEMPERATURE: 98.1 F | OXYGEN SATURATION: 97 % | BODY MASS INDEX: 45.54 KG/M2 | HEART RATE: 81 BPM | DIASTOLIC BLOOD PRESSURE: 67 MMHG | WEIGHT: 257 LBS | HEIGHT: 63 IN

## 2023-01-08 PROCEDURE — 250N000013 HC RX MED GY IP 250 OP 250 PS 637: Performed by: STUDENT IN AN ORGANIZED HEALTH CARE EDUCATION/TRAINING PROGRAM

## 2023-01-08 PROCEDURE — 99238 HOSP IP/OBS DSCHRG MGMT 30/<: CPT | Mod: GC | Performed by: OBSTETRICS & GYNECOLOGY

## 2023-01-08 RX ORDER — HYDROXYZINE HYDROCHLORIDE 25 MG/1
25 TABLET, FILM COATED ORAL EVERY 6 HOURS PRN
Qty: 15 TABLET | Refills: 0 | Status: SHIPPED | OUTPATIENT
Start: 2023-01-08

## 2023-01-08 RX ORDER — ACETAMINOPHEN 325 MG/1
650 TABLET ORAL EVERY 6 HOURS PRN
Qty: 100 TABLET | Refills: 0 | Status: SHIPPED | OUTPATIENT
Start: 2023-01-08

## 2023-01-08 RX ORDER — CYCLOBENZAPRINE HCL 10 MG
10 TABLET ORAL EVERY 8 HOURS PRN
Qty: 15 TABLET | Refills: 0 | Status: SHIPPED | OUTPATIENT
Start: 2023-01-08

## 2023-01-08 RX ORDER — AMOXICILLIN 250 MG
1 CAPSULE ORAL DAILY
Qty: 100 TABLET | Refills: 0 | Status: SHIPPED | OUTPATIENT
Start: 2023-01-08

## 2023-01-08 RX ADMIN — DOCUSATE SODIUM 100 MG: 100 CAPSULE, LIQUID FILLED ORAL at 08:01

## 2023-01-08 RX ADMIN — BUPRENORPHINE AND NALOXONE 1 FILM: 8; 2 FILM, SOLUBLE BUCCAL; SUBLINGUAL at 08:01

## 2023-01-08 RX ADMIN — ACETAMINOPHEN 650 MG: 325 TABLET, FILM COATED ORAL at 01:51

## 2023-01-08 RX ADMIN — ACETAMINOPHEN 650 MG: 325 TABLET, FILM COATED ORAL at 08:01

## 2023-01-08 ASSESSMENT — ACTIVITIES OF DAILY LIVING (ADL)
ADLS_ACUITY_SCORE: 18

## 2023-01-08 NOTE — PROGRESS NOTES
VSS. Fundus midline, firm, and U1. Lochia scant. Pain adequately managed with tylenol and flexeril. Voiding without difficulty. Plans to formula feed. Bonding well with . Continue with plan of care.

## 2023-01-08 NOTE — PROGRESS NOTES
"Postpartum Progress Note  Keyona Fisher  [id number]    Subjective:   Doing well this morning. Pain overall well controlled, does report some vaginal soreness but overall well controlled. She is ambulating without dizziness. Tolerating PO intake without nausea or vomiting. Lochia is similar to a period. Voiding spontaneously. Breast and bottle feeding well. No headache, vision changes, CP, SOB, RUQ pain.     Objective:  BP 97/51 (BP Location: Left arm, Patient Position: Supine, Cuff Size: Adult Large)   Pulse 74   Temp 97.8  F (36.6  C) (Oral)   Resp 14   Ht 1.6 m (5' 3\")   Wt 116.6 kg (257 lb)   LMP 2022   SpO2 97%   Breastfeeding Unknown   BMI 45.53 kg/m      General: NAD, comfortable, holding baby in bed  Heart: Regular rate, extremities warm and well-perfused  Lungs: Breathing comfortably, on room air  Abdomen: Soft, non-tender, non-distended; fundus firm below umbilicus  Extremities: no edema in BLE    Assessment/Plan: 32 year old  who is PPD#2 s/p . Pregnancy was notable for OUD. Currently stable and doing well.     OUD  - continue PTA suboxone    Hep C, untreated  LFTs stable on admission, ,729  - will refer to GI PP     Postpartum  - Continue routine post-partum cares.     - Heme: Appropriate blood loss during delivery. AM hgb 12.1. Continue to monitor for s/s of anemia. Repeat labs prn.   - Pain: Continue PRN tylenol, she has an anaphylactic reaction to ibuprofen. PRN flexeril and vistaril. She would like to avoid oxycodone  - GI: PRN senna, miralax, simethicone, and anti-emetics.  - : Voiding spontaneously   - Baby:  Stable, breast and bottle feeding, will stay for eat/sleep/console x 5 days.  - Contraception: Discussed pregnancy spacing, declines at this time, will discuss more at PP appt  - Rh pos, Rubella immune, GBS neg  - PPx: Encourage ambulation    Dispo: Discharge to home     Vanessa Hernadez MD  Ob/Gyn PGY-1  23 8:44 AM    Appreciate Dr. Hernadez's note above, " patient also seen and examined by me.  Patient noting muscle soreness and pelvic soreness.  Encouraged warm baths. I agree with the note above.   Tanya Molina MD

## 2023-01-08 NOTE — PLAN OF CARE
AF VSS, pitocin infusing per order parameters, BOW intact, epidural covering pain, plan for ESC post delivery- plan explained to patient, blood sugars within range- latent labor orders @ this time, last post prandial 117 mg/dl, anticipate .                         
Data: Patient presented to [facility] at 0740.   Reason for maternal/fetal assessment per patient is Induction Of Labor  .  Patient is a . Prenatal record reviewed.      OB History    Para Term  AB Living   6 4 4 0 1 4   SAB IAB Ectopic Multiple Live Births   0 0 0 0 4      # Outcome Date GA Lbr [place]/2nd Weight Sex Delivery Anes PTL Lv   6 Current            5 Term 18 38w3d 03:05 / 00:03 3.02 kg (6 lb 10.5 oz) M Vag-Spont EPI N OLE      Name: MITZI CASAS HENRY      Apgar1: 9  Apgar5: 9   4 AB            3 Term         OLE   2 Term         OLE   1 Term         OLE   . Medical history:   Past Medical History:   Diagnosis Date     Depression      Heroin use      PTSD (post-traumatic stress disorder)      Tobacco dependence    . Gestational Age 39w6d. VSS. Fetal movement present. Patient denies cramping, backache, vaginal discharge, pelvic pressure, UTI symptoms, GI problems, bloody show, vaginal bleeding, edema, headache, visual disturbances, epigastric or URQ pain, abdominal pain, rupture of membranes. Support persons not present.  Action: Verbal consent for EFM. Triage assessment completed. EFM applied for fetal monitoring. Uterine assessment see flowsheet. Fetal assessment: Presumed adequate fetal oxygenation documented (see flow record).   Response: Dr. Frazier informed of arrival. Plan per provider is IOL with pitocin and post prandial blood sugars. Patient verbalized agreement with plan. Patient transferred to room 471 ambulatory, oriented to room and call light.   
Goal Outcome Evaluation:    Data: Vital signs within normal limits. Postpartum checks within normal limits - see flow record. Patient eating and drinking normally. Patient able to empty bladder independently and is up ambulating. No apparent signs of infection. Perineum healing well. Patient performing self cares and is able to care for infant.  Action: Patient medicated during the shift for pain. See MAR. Patient reassessed within 1 hour after each medication and pain was improved - patient stated she was comfortable. Patient education done about infant care. See flow record.  Response: Positive attachment behaviors observed with infant. Support persons was present.   Plan: Anticipate discharge.    
Goal Outcome Evaluation: VSS. Postpartum checks WDL. Up independently, voiding without difficulty. Pain managed with tylenol, flexeril, and atarax. Formula feeding infant.    Pt discharged to boarding status with infant at 1230. Discharge instructions given and all questions answered. Discharge medications given and instructed on use. Pt to follow-up with provider in 6 weeks.                         
Goal Outcome Evaluation: VSS. Postpartum checks WDL. Up independently, voiding without difficulty. Pain managed with tylenol, flexeril, atarax, and hot packs. Plans to breastfeed and formula feed but only formula fed on my shift. Pt declined new nicotine patch, going outside to smoke.                         
Patient arrived to [facility] unit via wheelchair at 0140, with belongings, accompanied by nurses, with infant in arms. Received report from  Mariaa JACINTO  and checked bands. Unit and room orientation completd. Call light given; no concerns present at this time. IV saline locked. Continue with plan of care.                           
Patient delivered vaginal at 2257 with .    Laceration: None   Repair: No   Complication:  Category 2 fetal heart tracing   QBL: 125 mL   Fundus: Firm U/3   Lochia: Moderate   Voided: 150mL Straight Catheterization at 2140     Medications:  Antibiotics: No      Epidural: Yes   Pain Management: none   Postpartum Pitocin: After delivery of baby     Infant: male  APGAR: Weight: Breastfeeding:  Medications: Void: Stool:   5 minute: APGAR at 5 minutes:  9    No Meconium stained fluid     Report given to OPHELIA Lreoy at 11:22 PM.    
Detail Level: Simple
Additional Notes: Referral to Dr. Arce for further evaluation and possible biopsy will be made. Task sent to Dr. Arce team today to schedule.
Render Risk Assessment In Note?: no

## 2023-01-09 LAB
RPR SER QL: REACTIVE
RPR SER-TITR: ABNORMAL {TITER}

## 2023-01-10 ENCOUNTER — PATIENT OUTREACH (OUTPATIENT)
Dept: CARE COORDINATION | Facility: CLINIC | Age: 33
End: 2023-01-10

## 2023-01-10 NOTE — PROGRESS NOTES
Clinic Care Coordination Contact  [facility]/Voicemail       Clinical Data: Care Coordinator Outreach  Outreach attempted x 2.  Left message on patient's voicemail with call back information and requested return call.  Plan: Care Coordinator will make no further outreaches at this time.    CARLOS Hylton   Social Work Clinic Care Coordinator   [facility]  PH: [phone]  [email or web address]

## 2024-04-24 NOTE — IP AVS SNAPSHOT
[facility]    [address] [address]    Phone:  [phone]                                       After Visit Summary   1/26/2018    Keyona Fisher    MRN: [id number]           After Visit Summary Signature Page     I have received my discharge instructions, and my questions have been answered. I have discussed any challenges I see with this plan with the nurse or doctor.    ..........................................................................................................................................  Patient/Patient Representative Signature      ..........................................................................................................................................  Patient Representative Print Name and Relationship to Patient    ..................................................               ................................................  Date                                            Time    ..........................................................................................................................................  Reviewed by Signature/Title    ...................................................              ..............................................  Date                                                            Time           Strong peripheral pulses

## 2024-10-09 ENCOUNTER — TRANSFERRED RECORDS (OUTPATIENT)
Dept: HEALTH INFORMATION MANAGEMENT | Facility: CLINIC | Age: 34
End: 2024-10-09
Payer: COMMERCIAL

## 2024-10-17 ENCOUNTER — TRANSCRIBE ORDERS (OUTPATIENT)
Dept: MATERNAL FETAL MEDICINE | Facility: CLINIC | Age: 34
End: 2024-10-17
Payer: COMMERCIAL

## 2024-10-17 DIAGNOSIS — O09.90 HIGH-RISK PREGNANCY, UNSPECIFIED TRIMESTER: ICD-10-CM

## 2024-10-17 DIAGNOSIS — F19.90 SUBSTANCE USE DISORDER: Primary | ICD-10-CM

## 2024-10-17 DIAGNOSIS — O26.90 PREGNANCY RELATED CONDITION, ANTEPARTUM: ICD-10-CM

## 2024-10-17 DIAGNOSIS — O26.90 PREGNANCY RELATED CONDITION, ANTEPARTUM: Primary | ICD-10-CM

## 2024-10-21 ENCOUNTER — TELEPHONE (OUTPATIENT)
Dept: MATERNAL FETAL MEDICINE | Facility: CLINIC | Age: 34
End: 2024-10-21
Payer: COMMERCIAL

## 2024-10-21 NOTE — TELEPHONE ENCOUNTER
Return call from patient. Updated patient on her appointment times on Thursday, patient ok with the wait.

## 2024-10-22 ENCOUNTER — PRE VISIT (OUTPATIENT)
Dept: MATERNAL FETAL MEDICINE | Facility: CLINIC | Age: 34
End: 2024-10-22
Payer: COMMERCIAL

## 2024-10-24 ENCOUNTER — HOSPITAL ENCOUNTER (OUTPATIENT)
Dept: ULTRASOUND IMAGING | Facility: CLINIC | Age: 34
Discharge: HOME | End: 2024-10-24
Attending: STUDENT IN AN ORGANIZED HEALTH CARE EDUCATION/TRAINING PROGRAM
Payer: COMMERCIAL

## 2024-10-24 ENCOUNTER — OFFICE VISIT (OUTPATIENT)
Dept: MATERNAL FETAL MEDICINE | Facility: CLINIC | Age: 34
End: 2024-10-24
Attending: STUDENT IN AN ORGANIZED HEALTH CARE EDUCATION/TRAINING PROGRAM
Payer: COMMERCIAL

## 2024-10-24 DIAGNOSIS — O09.33 LATE PRENATAL CARE AFFECTING PREGNANCY IN THIRD TRIMESTER: ICD-10-CM

## 2024-10-24 DIAGNOSIS — F19.90 SUBSTANCE USE DISORDER: ICD-10-CM

## 2024-10-24 DIAGNOSIS — O23.03 PYELONEPHRITIS AFFECTING PREGNANCY IN THIRD TRIMESTER: Primary | ICD-10-CM

## 2024-10-24 DIAGNOSIS — O09.90 HIGH-RISK PREGNANCY, UNSPECIFIED TRIMESTER: ICD-10-CM

## 2024-10-24 DIAGNOSIS — O98.419 HEPATITIS C VIRUS INFECTION IN MOTHER DURING PREGNANCY: ICD-10-CM

## 2024-10-24 DIAGNOSIS — O26.90 PREGNANCY RELATED CONDITION, ANTEPARTUM: ICD-10-CM

## 2024-10-24 DIAGNOSIS — B19.20 HEPATITIS C VIRUS INFECTION IN MOTHER DURING PREGNANCY: ICD-10-CM

## 2024-10-24 PROCEDURE — 76811 OB US DETAILED SNGL FETUS: CPT

## 2024-10-24 PROCEDURE — G0463 HOSPITAL OUTPT CLINIC VISIT: HCPCS | Mod: 25 | Performed by: OBSTETRICS & GYNECOLOGY

## 2024-10-24 PROCEDURE — 99215 OFFICE O/P EST HI 40 MIN: CPT | Mod: 25 | Performed by: OBSTETRICS & GYNECOLOGY

## 2024-10-24 PROCEDURE — 76811 OB US DETAILED SNGL FETUS: CPT | Mod: 26 | Performed by: OBSTETRICS & GYNECOLOGY

## 2024-10-24 NOTE — PROGRESS NOTES
[facility]  [address] [place] [address] [address], [place] [address]  Main: [phone], Fax: [phone]       Referring Provider:  Anaid SKINNER     Keyona Fisher is a 34 year old  at 34w5d by 32w5d US here for MFM consultation regarding pregnancy complicated late entry to care, short interval pregnancy, hepatitis C, substance use disorder on suboxone, and pyelonephritis. She presented to care with flank pain, subjective fevers, chills, WBC of 16.1 and a positive UA on 10/17/24 and received a dose of IV ceftriaxone before leaving due to  concerns. She was advised to stay for continued IV antibiotics and to await culture but she ultimately left with a 10 day course of Keflex orally. While in triage she had a dating ultrasound completed and initial obstetric labs were collected. Her labs were notable for a hepatitis C quant of 2.5 million, up from [phone] at the time of her most recent delivery in 2023.    Since discharge from the hospital Keyona has discontinued the Keflex due to gastrointestinal upset from the medication. She notes that she no longer has any flank pain, any is only having discomfort that she attributes to her bladder. After discharge she was taking tylenol around the clock for pain, which she now is only taking intermittently. She is not having any fever or chills. Keyona has been drinking a lot of water and cranberry juice in attempt to flush her kidneys. She was sweating at the time of the visit today, which she notes is always experiences after her Suboxone dosing. Her urine culture returned >100,00K E coli (ESBL), which is resistant to both Ceftriaxone and Keflex.    Prenatal Care:  Primary OB care this pregnancy has been with Dr. Worrell.     OB History    Para Term  AB Living   7 5 5 0 1 5   SAB IAB Ectopic Multiple Live Births   0 0 0 0 5      # Outcome Date GA Lbr Preston/2nd Weight Sex Type Anes PTL Lv   7 Current            6 Term 23  39w6d 10:43 / 00:14 3.36 kg (7 lb 6.5 oz) M Vag-Spont EPI N OLE      Name: AUGUSTO,MALE-HENRY      Apgar1: 9  Apgar5: 9   5 Term 01/26/18 38w3d 03:05 / 00:03 3.02 kg (6 lb 10.5 oz) M Vag-Spont EPI N OLE      Name: AUGUSTOBABY1 HENRY      Apgar1: 9  Apgar5: 9   4 Term 01/13/16 40w4d  3.379 kg (7 lb 7.2 oz) M Vag-Spont Spinal  OLE      Apgar1: 8  Apgar5: 9   3 Term 08/10/12 40w4d 10:30 / 00:09 3.629 kg (8 lb) F Vag-Spont   OLE      Name: AMARIANA      Apgar1: 9  Apgar5: 9   2 Term 12/20/08 40w0d  3.317 kg (7 lb 5 oz) F Vag-Spont   OLE      Name: Kristel Fisher   1 AB                Gynecologic History   - Denies any history of abnormal pap smears  - Denies prior cervical surgery or procedures  - Denies any history of frequent UTIs, vaginal infections, or STIs    Past Medical History     Past Medical History:   Diagnosis Date    Depression     Heroin use     PTSD (post-traumatic stress disorder)     Tobacco dependence      Past Surgical History   No past surgical history on file.    Medication List     Prior to Admission medications    Medication Sig Last Dose Taking? Auth Provider Long Term End Date   acetaminophen (TYLENOL) 325 MG tablet Take 2 tablets (650 mg) by mouth every 6 hours as needed for mild pain Start after Delivery.   Vanessa Hernadez MD     Acetaminophen 325 MG CAPS Take 325-650 mg by mouth every 4 hours as needed   Reported, Patient     buprenorphine HCl-naloxone HCl (SUBOXONE) 8-2 MG per film Place 1 Film under the tongue 2 times daily   Reported, Patient     cyclobenzaprine (FLEXERIL) 10 MG tablet Take 1 tablet (10 mg) by mouth every 8 hours as needed for muscle spasms   Tanya Molina MD     hydrOXYzine (ATARAX) 25 MG tablet Take 1 tablet (25 mg) by mouth every 6 hours as needed for other (adjuvant pain)   Tanya Molina MD     Prenatal Vit-Fe Fumarate-FA (PRENATAL MULTIVITAMIN W/IRON) 27-0.8 MG tablet Take 1 tablet by mouth daily   Reported, Patient     QUEtiapine (SEROQUEL) 100 MG tablet Take 100  "mg by mouth At Bedtime   Reported, Patient Yes    senna-docusate (SENOKOT-S/PERICOLACE) 8.6-50 MG tablet Take 1 tablet by mouth daily Start after delivery.   Vanessa Hernadez MD         Allergies   Ibuprofen    Review of Systems   10-point ROS negative except as in HPI.    Physical Exam   /86 (BP Location: Right arm, Patient Position: Sitting, Cuff Size: Adult Large)  Pulse 100   Temp 98.4  F (36.9  C) (Oral)   Resp 16    Gen: NAD, perspiration noted  Abd: Gravid, non-tender, No CVA tenderness    Labs   Urine culture 10/17/24:  E Coli >100,000 (ESBL) Resistant to Ceftriaxone and Keflex. Sensitive to Bactrim, Macrobid and Cipro    Ultrasound   See today's ultrasound report under the \"imaging\" tab.    Assessment/Counseling     34 year old  at 34w5d by 32w5d US here for MFM consultation regarding pregnancy complicated late entry to care, short interval pregnancy, hepatitis C, substance use disorder on suboxone, and pyelonephritis.     Hepatitis C  People chronically infected with hepatitis C generally have an uneventful pregnancy without worsening of liver disease or increased risk of fetal malformations. A wide range of vertical transmission rates has been reported, from 0-36%.  The risk of vertical transmission is highest when the patient was co-infected with HIV. In HCV-positive, HIV-negative patients, the risk of HCV vertical transmission appears to be less than 18%.  In chronically infected anti-HCV antibody positive people, the risk is even lower at 5-10%.  Transmission only occurs from mothers who are HCV-RNA positive (as opposed to those who are anti-HCV positive but HCV-RNA negative).  The risk of transmission may, like HIV, be in part related to the level of viremia at the time of birth.      Treatment during pregnancy is controversial, and interferon use is discouraged. Coinfection with any of the other hepatitides can be lethal. Hepatitis is a lifelong disease and the patient is aware that she " needs to follow closely with a gastroenterologist and/or hepatologist.       delivery has not been shown to decrease the risk of vertical transmission and therefore should be reserved for general obstetric indications. During the intrapartum period invasive procedures on the fetus (like fetal scalp electrodes) should be limited.  There is no evidence that breastfeeding is a risk for infection among infants born to HCV-infected women, and both the American College of Obstetricians and Gynecologists and the American Academy of Pediatrics support breastfeeding by HCV-infected mothers.     History of syphilis  Keyona has a history of syphilis and was treated previously. Her most recent RPR titer was 1:1 on 10/9/2024 (previous titer was 1:2 on 2023). Recommend repeat syphilis screening upon admission to L&D.    Urinary tract infection  Keyona was gnosed with pyelonephritis on 10/17/2024 after abnormal urinalysis, elevated WBC and flank pain. She received a dose of Ceftriaxone in the hospital, and was discharged home with oral Keflex, which she did not tolerate due to GI side effects. We discussed that her urine culture returned positive for ESBL, which is resistant the the antibiotics that she previously received. Despite inadequate treatment for the suspected pyelonephritis, Keyona has had resolution of the flank pain and is feeling overall well. We discussed that the standard of care for treatment of pyelonephritis in pregnancy is inpatient admission until improvement given the maternal risks associated with pyelonephritis in pregnancy. However, Ms. Fisher is unable to be admitted to the hospital as she has to care for her children. We discussed that given she is afebrile today and without any flank pain on exam that can begin with treating her with an antibiotic that the ESBL is sensitive to (see recommendations below), but that we would recommend inpatient management if she develops clinical signs of  pyelonephritis with fever and/or flank pain.    History of OUD, on Suboxone  Keyona is doing well on her current Suboxone dosing and does not have any questions or concerns related to this today. She follows with Dr. Worrell.    Recommendations     Recommendations:  Repeat CBC with planned labs.  Begin Bactrim DS one tablet by mouth twice daily for two weeks.  After completion of Bactrim, begin macrobid 100 mg daily for UTI suppression for the duration of pregnancy.  Repeat urine culture after completion of Bactrim  If develops clinical concerns for pyelonephritis we strongly recommend inpatient management (fever, return of flank pain)  Baseline LFTs  Consultation with gastroenterology or hepatology now and after pregnancy for long term care given that this is a lifelong issue.   delivery reserved for usual obstetric indications.  Avoid invasive fetal procedures intrapartum.  Breastfeeding is not contraindicated.  Evaluation of  by pediatricians after delivery.  Continue management of OUD by Dr. Worrell  Repeat syphilis testing upon admission for labor and delivery  A repeat assessment of fetal growth and the anatomy that was sub-optimally visualized is scheduled in our office at 39 weeks gestation.  surveillance with weekly BPP is recommended to begin at that time given that this is a sub-optimally dated pregnancy.    The patient was seen and evaluated with Dr. Sharonda Samano.    At the end of our discussion, Ms. Fisher indicated that her questions were answered and she seemed satisfied with our discussion.  Thank you for allowing us to participate in the care of your patient. Please do not hesitate to contact us if you have further questions regarding the management of your patient.       Sincerely,  Kylee Aguilar MD  Obstetrics & Gynecology, PGY-2  10/24/2024       I have seen and evaluated the patient with Dr. Samano. I reviewed her chart and agree with the above documented assessment and  plan. I spent a total of 45 minutes on the date of this encounter including preparing to see the patient (reviewing medical records/tests), counseling and discussing the plan of care, documenting the visit in the electronic medical record, and communicating with other health care professionals and/or care coordination.Please see her note for specific details; I have made the necessary edits/additions.      Sharonda Samano MD  Specialist in Maternal-Fetal Medicine